# Patient Record
Sex: MALE | Race: BLACK OR AFRICAN AMERICAN | NOT HISPANIC OR LATINO | Employment: OTHER | URBAN - METROPOLITAN AREA
[De-identification: names, ages, dates, MRNs, and addresses within clinical notes are randomized per-mention and may not be internally consistent; named-entity substitution may affect disease eponyms.]

---

## 2023-09-22 ENCOUNTER — HOSPITAL ENCOUNTER (OUTPATIENT)
Dept: DATA CONVERSION | Facility: HOSPITAL | Age: 63
Discharge: HOME HEALTH | End: 2023-09-24

## 2023-09-22 DIAGNOSIS — R33.9 RETENTION OF URINE, UNSPECIFIED: ICD-10-CM

## 2023-09-22 DIAGNOSIS — Z90.5 ACQUIRED ABSENCE OF KIDNEY: ICD-10-CM

## 2023-09-22 DIAGNOSIS — R11.2 NAUSEA WITH VOMITING, UNSPECIFIED: ICD-10-CM

## 2023-09-22 DIAGNOSIS — R10.819 ABDOMINAL TENDERNESS, UNSPECIFIED SITE: ICD-10-CM

## 2023-09-22 DIAGNOSIS — E83.52 HYPERCALCEMIA: ICD-10-CM

## 2023-09-22 DIAGNOSIS — R42 DIZZINESS AND GIDDINESS: ICD-10-CM

## 2023-09-22 DIAGNOSIS — H40.9 UNSPECIFIED GLAUCOMA: ICD-10-CM

## 2023-09-22 LAB
ALBUMIN SERPL-MCNC: 4.2 GM/DL (ref 3.5–5)
ALBUMIN/GLOB SERPL: 1.1 RATIO (ref 1.5–3)
ALP BLD-CCNC: 43 U/L (ref 35–125)
ALT SERPL-CCNC: 10 U/L (ref 5–40)
ALT SERPL-CCNC: ABNORMAL U/L (ref 5–40)
ANION GAP SERPL CALCULATED.3IONS-SCNC: 11 MMOL/L (ref 0–19)
ANTICOAGULANT: NORMAL
ANTICOAGULANT: NORMAL
APTT PPP: 23 SEC (ref 22–32.5)
AST SERPL-CCNC: 13 U/L (ref 5–40)
AST SERPL-CCNC: ABNORMAL U/L (ref 5–40)
BACTERIA UR QL AUTO: NEGATIVE
BASOPHILS # BLD AUTO: 0.03 K/UL (ref 0–0.22)
BASOPHILS NFR BLD AUTO: 0.4 % (ref 0–1)
BILIRUB DIRECT SERPL-MCNC: 0.2 MG/DL (ref 0–0.2)
BILIRUB INDIRECT SERPL-MCNC: 0.2 MG/DL (ref 0–0.8)
BILIRUB SERPL-MCNC: 0.4 MG/DL (ref 0.1–1.2)
BILIRUB UR QL STRIP.AUTO: NEGATIVE
BUN SERPL-MCNC: 13 MG/DL (ref 8–25)
BUN/CREAT SERPL: 14.4 RATIO (ref 8–21)
CALCIUM SERPL-MCNC: 11.2 MG/DL (ref 8.5–10.4)
CHLORIDE SERPL-SCNC: 105 MMOL/L (ref 97–107)
CLARITY UR: CLEAR
CO2 SERPL-SCNC: 25 MMOL/L (ref 24–31)
COLOR UR: ABNORMAL
CREAT SERPL-MCNC: 0.9 MG/DL (ref 0.4–1.6)
DEPRECATED RDW RBC AUTO: 46.7 FL (ref 37–54)
DIFFERENTIAL METHOD BLD: ABNORMAL
EOSINOPHIL # BLD AUTO: 0.05 K/UL (ref 0–0.45)
EOSINOPHIL NFR BLD: 0.7 % (ref 0–3)
ERYTHROCYTE [DISTWIDTH] IN BLOOD BY AUTOMATED COUNT: 13.6 % (ref 11.7–15)
GFR SERPL CREATININE-BSD FRML MDRD: 96 ML/MIN/1.73 M2
GLOBULIN SER-MCNC: 3.7 G/DL (ref 1.9–3.7)
GLUCOSE SERPL-MCNC: 148 MG/DL (ref 65–99)
GLUCOSE UR STRIP.AUTO-MCNC: NEGATIVE MG/DL
HCT VFR BLD AUTO: 50.7 % (ref 41–50)
HGB BLD-MCNC: 15.9 GM/DL (ref 13.5–16.5)
HGB UR QL STRIP.AUTO: 1 /HPF (ref 0–3)
HGB UR QL: NEGATIVE
HS TROPONIN T DELTA: 1 (ref 0–4)
HS TROPONIN T DELTA: NORMAL (ref 0–4)
HYALINE CASTS UR QL AUTO: ABNORMAL /LPF
IMM GRANULOCYTES # BLD AUTO: 0.04 K/UL (ref 0–0.1)
INR PPP: 1 (ref 0.86–1.16)
KETONES UR QL STRIP.AUTO: NEGATIVE
LEUKOCYTE ESTERASE UR QL STRIP.AUTO: NEGATIVE
LYMPHOCYTES # BLD AUTO: 1.38 K/UL (ref 1.2–3.2)
LYMPHOCYTES NFR BLD MANUAL: 19 % (ref 20–40)
MCH RBC QN AUTO: 28.8 PG (ref 26–34)
MCHC RBC AUTO-ENTMCNC: 31.4 % (ref 31–37)
MCV RBC AUTO: 91.7 FL (ref 80–100)
MICROSCOPIC (UA): ABNORMAL
MONOCYTES # BLD AUTO: 0.51 K/UL (ref 0–0.8)
MONOCYTES NFR BLD MANUAL: 7 % (ref 0–8)
NEUTROPHILS # BLD AUTO: 5.24 K/UL
NEUTROPHILS # BLD AUTO: 5.24 K/UL (ref 1.8–7.7)
NEUTROPHILS.IMMATURE NFR BLD: 0.6 % (ref 0–1)
NEUTS SEG NFR BLD: 72.3 % (ref 50–70)
NITRITE UR QL STRIP.AUTO: NEGATIVE
NRBC BLD-RTO: 0 /100 WBC
PH UR STRIP.AUTO: 6 [PH] (ref 4.6–8)
PLATELET # BLD AUTO: 248 K/UL (ref 150–450)
PMV BLD AUTO: 11.1 CU (ref 7–12.6)
POTASSIUM SERPL-SCNC: 4.7 MMOL/L (ref 3.4–5.1)
POTASSIUM SERPL-SCNC: ABNORMAL MMOL/L (ref 3.4–5.1)
PROT SERPL-MCNC: 7.9 G/DL (ref 5.9–7.9)
PROT UR STRIP.AUTO-MCNC: ABNORMAL MG/DL
PROTHROMBIN TIME: 10.3 SEC (ref 9.3–12.7)
RBC # BLD AUTO: 5.53 M/UL (ref 4.5–5.5)
SODIUM SERPL-SCNC: 140 MMOL/L (ref 133–145)
SP GR UR STRIP.AUTO: 1.02 (ref 1–1.03)
SQUAMOUS UR QL AUTO: ABNORMAL /HPF
TROPONIN T SERPL-MCNC: 6 NG/L
TROPONIN T SERPL-MCNC: 7 NG/L
URINE CULTURE: ABNORMAL
UROBILINOGEN UR QL STRIP.AUTO: NORMAL MG/DL (ref 0–1)
WBC # BLD AUTO: 7.3 K/UL (ref 4.5–11)
WBC #/AREA URNS AUTO: 1 /HPF (ref 0–3)

## 2023-09-23 LAB
25(OH)D3 SERPL-MCNC: 8 NG/ML (ref 31–100)
ALBUMIN SERPL-MCNC: 3.4 GM/DL (ref 3.5–5)
ALBUMIN/GLOB SERPL: 1 RATIO (ref 1.5–3)
ALP BLD-CCNC: 38 U/L (ref 35–125)
ALT SERPL-CCNC: 10 U/L (ref 5–40)
ANION GAP SERPL CALCULATED.3IONS-SCNC: 7 MMOL/L (ref 0–19)
APPEARANCE PLAS: CLEAR
AST SERPL-CCNC: 13 U/L (ref 5–40)
BILIRUB SERPL-MCNC: 0.7 MG/DL (ref 0.1–1.2)
BUN SERPL-MCNC: 12 MG/DL (ref 8–25)
BUN/CREAT SERPL: 12 RATIO (ref 8–21)
CALCIUM SERPL-MCNC: 10.1 MG/DL (ref 8.5–10.4)
CHLORIDE SERPL-SCNC: 107 MMOL/L (ref 97–107)
CHOLEST SERPL-MCNC: 182 MG/DL (ref 133–200)
CHOLEST/HDLC SERPL: 2.7 RATIO
CO2 SERPL-SCNC: 23 MMOL/L (ref 24–31)
COLOR SPUN FLD: YELLOW
CREAT SERPL-MCNC: 1 MG/DL (ref 0.4–1.6)
DEPRECATED RDW RBC AUTO: 46.5 FL (ref 37–54)
ERYTHROCYTE [DISTWIDTH] IN BLOOD BY AUTOMATED COUNT: 13.8 % (ref 11.7–15)
FASTING STATUS PATIENT QL REPORTED: NORMAL
GFR SERPL CREATININE-BSD FRML MDRD: 85 ML/MIN/1.73 M2
GLOBULIN SER-MCNC: 3.4 G/DL (ref 1.9–3.7)
GLUCOSE BLD STRIP.AUTO-MCNC: 129 MG/DL (ref 65–99)
GLUCOSE BLD STRIP.AUTO-MCNC: 89 MG/DL (ref 65–99)
GLUCOSE BLD STRIP.AUTO-MCNC: 91 MG/DL (ref 65–99)
GLUCOSE SERPL-MCNC: 101 MG/DL (ref 65–99)
HBA1C MFR BLD: 5.9 % (ref 4–6)
HCT VFR BLD AUTO: 43.3 % (ref 41–50)
HDLC SERPL-MCNC: 67 MG/DL
HGB BLD-MCNC: 13.9 GM/DL (ref 13.5–16.5)
LDLC SERPL CALC-MCNC: 93 MG/DL (ref 65–130)
MCH RBC QN AUTO: 29.3 PG (ref 26–34)
MCHC RBC AUTO-ENTMCNC: 32.1 % (ref 31–37)
MCV RBC AUTO: 91.4 FL (ref 80–100)
NRBC BLD-RTO: 0 /100 WBC
PHOSPHATE SERPL-MCNC: 2.2 MG/DL (ref 2.5–4.5)
PLATELET # BLD AUTO: 192 K/UL (ref 150–450)
PMV BLD AUTO: 11 CU (ref 7–12.6)
POTASSIUM SERPL-SCNC: 4.1 MMOL/L (ref 3.4–5.1)
PROT SERPL-MCNC: 6.8 G/DL (ref 5.9–7.9)
RBC # BLD AUTO: 4.74 M/UL (ref 4.5–5.5)
SODIUM SERPL-SCNC: 137 MMOL/L (ref 133–145)
TRIGL SERPL-MCNC: 109 MG/DL (ref 40–150)
WBC # BLD AUTO: 5.8 K/UL (ref 4.5–11)

## 2023-09-24 LAB — GLUCOSE BLD STRIP.AUTO-MCNC: 109 MG/DL (ref 65–99)

## 2023-09-27 LAB — 1,25(OH)2D3 SERPL-MCNC: NORMAL PG/ML

## 2023-10-02 LAB
CALCIUM SERPL-MCNC: 10.1 MG/DL (ref 8.5–10.4)
PHOSPHATE SERPL-MCNC: 2.2 MG/DL (ref 2.5–4.5)
PTH-INTACT SERPL-MCNC: ABNORMAL PG/ML (ref 15–65)

## 2023-10-30 ENCOUNTER — TELEPHONE (OUTPATIENT)
Dept: PRIMARY CARE | Facility: CLINIC | Age: 63
End: 2023-10-30
Payer: MEDICARE

## 2023-10-30 NOTE — TELEPHONE ENCOUNTER
In regards to received message, can we please clarify if patient is requesting to have referral to local urologist or if he is wanting a referral to a urologist in [place].  Separately, I would also need to know the diagnosis related to his surgery or active symptoms that referral is in nature to.

## 2023-10-30 NOTE — TELEPHONE ENCOUNTER
Pt called in for a referral for a urologist states that this is in regards to a surgery that has went bad the doctors that handled this are  Dr. Jn Graff  [phone] Phone in [facility] ([facility] know the spelling) also [facility] [phone] has other information regarding this matter. His appointment for the referral is this 11/02/2023

## 2023-11-01 RX ORDER — VIT C/E/ZN/COPPR/LUTEIN/ZEAXAN 250MG-90MG
25 CAPSULE ORAL
COMMUNITY
Start: 2023-10-21

## 2023-11-01 RX ORDER — MECLIZINE HYDROCHLORIDE 25 MG/1
25 TABLET ORAL 3 TIMES DAILY PRN
COMMUNITY
Start: 2023-09-24 | End: 2023-11-02 | Stop reason: WASHOUT

## 2023-11-02 ENCOUNTER — OFFICE VISIT (OUTPATIENT)
Dept: PRIMARY CARE | Facility: CLINIC | Age: 63
End: 2023-11-02
Payer: MEDICARE

## 2023-11-02 VITALS
HEART RATE: 72 BPM | WEIGHT: 244.8 LBS | DIASTOLIC BLOOD PRESSURE: 80 MMHG | HEIGHT: 69 IN | TEMPERATURE: 96.5 F | SYSTOLIC BLOOD PRESSURE: 142 MMHG | OXYGEN SATURATION: 99 % | BODY MASS INDEX: 36.26 KG/M2
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
## 2023-11-02 DIAGNOSIS — Z90.5 HISTORY OF NEPHRECTOMY, LEFT: ICD-10-CM

## 2023-11-02 DIAGNOSIS — R39.11 URINARY HESITANCY: ICD-10-CM

## 2023-11-02 DIAGNOSIS — N53.19 ABNORMAL EJACULATION: ICD-10-CM

## 2023-11-02 DIAGNOSIS — M25.512 ACUTE PAIN OF LEFT SHOULDER: ICD-10-CM

## 2023-11-02 DIAGNOSIS — R39.198 URINARY DYSFUNCTION: Primary | ICD-10-CM

## 2023-11-02 DIAGNOSIS — M25.552 LEFT HIP PAIN: ICD-10-CM

## 2023-11-02 PROBLEM — R35.0 URINARY FREQUENCY: Status: ACTIVE | Noted: 2023-11-02

## 2023-11-02 PROCEDURE — 99214 OFFICE O/P EST MOD 30 MIN: CPT | Performed by: FAMILY MEDICINE

## 2023-11-02 PROCEDURE — 1036F TOBACCO NON-USER: CPT | Performed by: FAMILY MEDICINE

## 2023-11-02 RX ORDER — HYDROCODONE BITARTRATE AND ACETAMINOPHEN 10; 325 MG/1; MG/1
1 TABLET ORAL EVERY 6 HOURS PRN
Qty: 21 TABLET | Refills: 0 | Status: SHIPPED | OUTPATIENT
Start: 2023-11-02 | End: 2023-11-13

## 2023-11-02 RX ORDER — DICLOFENAC SODIUM 10 MG/G
4 GEL TOPICAL 4 TIMES DAILY
Qty: 100 G | Refills: 1 | Status: SHIPPED | OUTPATIENT
Start: 2023-11-02 | End: 2023-11-13 | Stop reason: ALTCHOICE

## 2023-11-02 ASSESSMENT — PAIN SCALES - GENERAL: PAINLEVEL: 7

## 2023-11-02 NOTE — ASSESSMENT & PLAN NOTE
- Continue to work with chiropractic medicine with pending imaging  -Secondary to your ongoing shoulder and hip pain and limitations in pain medications from both effectiveness and safety profile, I have sent discussed pain medication with plans to use sparingly  -As they are controlled medications, we did talk about my limitations to give further prescriptions.  If pain complaints continue, would need to link with pain management for any going need/use
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
## 2023-11-02 NOTE — ASSESSMENT & PLAN NOTE
- Secondary to your symptoms and history, will plan to coordinate with new local urologist with referral placed today

## 2023-11-13 ENCOUNTER — OFFICE VISIT (OUTPATIENT)
Dept: UROLOGY | Facility: CLINIC | Age: 63
End: 2023-11-13
Payer: MEDICARE

## 2023-11-13 VITALS
TEMPERATURE: 97.3 F | HEIGHT: 69 IN | DIASTOLIC BLOOD PRESSURE: 78 MMHG | WEIGHT: 244.4 LBS | SYSTOLIC BLOOD PRESSURE: 142 MMHG | BODY MASS INDEX: 36.2 KG/M2

## 2023-11-13 DIAGNOSIS — R35.0 URINARY FREQUENCY: ICD-10-CM

## 2023-11-13 DIAGNOSIS — N53.19 ABNORMAL EJACULATION: ICD-10-CM

## 2023-11-13 DIAGNOSIS — R31.29 OTHER MICROSCOPIC HEMATURIA: ICD-10-CM

## 2023-11-13 DIAGNOSIS — R39.11 URINARY HESITANCY: ICD-10-CM

## 2023-11-13 DIAGNOSIS — N20.0 NEPHROLITHIASIS: ICD-10-CM

## 2023-11-13 DIAGNOSIS — R39.198 URINARY DYSFUNCTION: ICD-10-CM

## 2023-11-13 DIAGNOSIS — Z90.5 HISTORY OF NEPHRECTOMY, LEFT: ICD-10-CM

## 2023-11-13 DIAGNOSIS — R82.90 ABNORMAL URINALYSIS: ICD-10-CM

## 2023-11-13 DIAGNOSIS — R82.90 ABNORMAL URINALYSIS: Primary | ICD-10-CM

## 2023-11-13 LAB
POC APPEARANCE, URINE: CLEAR
POC BILIRUBIN, URINE: NEGATIVE
POC BLOOD, URINE: ABNORMAL
POC COLOR, URINE: YELLOW
POC GLUCOSE, URINE: NEGATIVE MG/DL
POC KETONES, URINE: NEGATIVE MG/DL
POC LEUKOCYTES, URINE: NEGATIVE
POC NITRITE,URINE: NEGATIVE
POC PH, URINE: 5.5 PH
POC PROTEIN, URINE: NEGATIVE MG/DL
POC SPECIFIC GRAVITY, URINE: 1.02
POC UROBILINOGEN, URINE: 0.2 EU/DL

## 2023-11-13 PROCEDURE — 81002 URINALYSIS NONAUTO W/O SCOPE: CPT | Performed by: STUDENT IN AN ORGANIZED HEALTH CARE EDUCATION/TRAINING PROGRAM

## 2023-11-13 PROCEDURE — 99204 OFFICE O/P NEW MOD 45 MIN: CPT | Performed by: STUDENT IN AN ORGANIZED HEALTH CARE EDUCATION/TRAINING PROGRAM

## 2023-11-13 PROCEDURE — 81001 URINALYSIS AUTO W/SCOPE: CPT

## 2023-11-13 PROCEDURE — 1036F TOBACCO NON-USER: CPT | Performed by: STUDENT IN AN ORGANIZED HEALTH CARE EDUCATION/TRAINING PROGRAM

## 2023-11-13 NOTE — PROGRESS NOTES
Taye presents as a new patient for an evaluation.  The patient’s EMR has been reviewed.  Lives in [place], [place].  Referred by Dr. Villalpando, PCP.    Longstanding h/o recurrent kidney stones.    Previously followed by urologist in [place], at [facility].  Has not seen a Urologist since moving to [place] in 2022.   S/p left nephrectomy in 2020 secondary to recurrent stone formation and decreased kidney function.   This was a pelvic kidney  It was complicated by a contralateral ureter injury requiring ureter reimplant     The procedure was initially performed robotically, however was decided to switch to manually. The transition to manual was c/b an injury to intra-abdominal structures including stomach and right ureter. Since then, has c/o ongoing urinary and ejaculation dysfunction.    SUBJECTIVE: HPI   TODAY (11/13/23)  C/o weak stream, urinary urgency, fequency (q1hr) and hesitancy.    Previously was on tamsulosin, however no benefit. Since discontinued.   Denotes anejaculation as well since the nephrectomy procedure.   Additional PSH of undergoing a Urolift procedure following the nephrectomy  Reports ejaculatory dysfunction since nephrectomy  Has no semen with orgasm  Denies medical or other surgical history of BPH    Latest CT (09/22/23) reviewed.   IO urinalysis showed trace blood, no infection.  Renal function considered stable (Sept 2023).    Past medical, surgical, family and social history in the chart was reviewed and is accurate including any additions to what is in this HPI.    Review of Systems   Constitutional: denies any unintentional weight loss or change in strength.  Integumentary: denies any rashes or pruritus.  Eyes: denies any double vision or eye pain.  Ear/Nose/Mouth/Throat: denies any nosebleeds or gum bleeds.  Cardiovascular: denies any chest pain or syncope.  Respiratory: denies hemoptysis.  Gastrointestinal: denies nausea or vomiting.  Musculoskeletal: denies muscle  cramping or weakness.  Neurologic: denies convulsions or seizures.  Hematologic/Lymphatic: denies bleeding tendencies.  Endocrine: denies heat/cold intolerance.  All other systems have been reviewed and are negative unless otherwise noted in the HPI.    OBJECTIVE:  There were no vitals taken for this visit.  Physical Exam   Constitutional: No obvious distress.  Eyes: Non-injected conjunctiva, sclera clear, EOMI.  Ears/Nose/Mouth/Throat: No obvious drainage per ears or nose.  Cardiovascular: Extremities are warm and well perfused. No edema, cyanosis or pallor.  Respiratory: No audible wheezing/stridor; respirations do not appear labored.  Gastrointestinal: Abdomen soft, not distended.  Musculoskeletal: Normal ROM of extremities.  Skin: No obvious rashes or open sores.  Neurologic: Alert and oriented, CN 2-12 grossly intact.  Psychiatric: Answers questions appropriately with normal affect.  Hematologic/Lymphatic/Immunologic: No obvious bruises or sites of spontaneous bleeding.  Genitourinary: No CVA tenderness, bladder not palpable.     Labs and imaging:  Lab Results   Component Value Date    WBC 5.8 09/23/2023    HGB 13.9 09/23/2023    HCT 43.3 09/23/2023     09/23/2023    CHOL 182 09/23/2023    TRIG 109 09/23/2023    HDL 67 09/23/2023    ALT 10 09/23/2023    AST 13 09/23/2023     09/23/2023    K 4.1 09/23/2023     09/23/2023    CREATININE 1.0 09/23/2023    BUN 12 09/23/2023    CO2 23 (L) 09/23/2023    INR 1.0 09/22/2023    HGBA1C 5.9 09/23/2023     ASSESSMENT:  Problem List Items Addressed This Visit       Urinary hesitancy    Urinary frequency - Primary    Relevant Orders    POCT UA (nonautomated) manually resulted (Completed)    Measure post void residual (Completed)    Cystourethroscopy    Follow Up In Urology    Urinary dysfunction    Abnormal ejaculation    Relevant Orders    Cystourethroscopy    History of nephrectomy, left    Relevant Orders    US renal complete     Other Visit Diagnoses        Nephrolithiasis        Relevant Orders    US renal complete     PLAN:  Obtain a renal US for stone surveillance, next due in 1 year  Report from last CT A/P from 09/2023 reviewed, no hydro or stones of right kidney, mild pelviectasis and diverticulum of the bladder likely related to previous reimplant   IO urinalysis showed trace blood. Send for micro.   Plan to FU in a 4 weeks for flow/pvr given his concern for scar tissue causing his urinary and ejaculatory dysfunction    If significantly obstructed, discussed cystoscopy.  He is hesitant with proceeding with cystoscopy 2/2 anxiety.   Will revisit plan after review of his uroflow.    All questions were answered to the patient’s satisfaction.  Patient agrees with the plan and wishes to proceed.    Scribed for Dr. Bart Dean by Javier Manuel.  I, Dr. Bart Dean have personally reviewed and agreed with the information entered by the Virtual Scribe. 11/13/23.

## 2023-11-14 LAB
RBC #/AREA URNS AUTO: NORMAL /HPF
WBC #/AREA URNS AUTO: NORMAL /HPF

## 2023-11-27 ENCOUNTER — OFFICE VISIT (OUTPATIENT)
Dept: UROLOGY | Facility: CLINIC | Age: 63
End: 2023-11-27
Payer: MEDICAID

## 2023-11-27 VITALS — HEIGHT: 68 IN | BODY MASS INDEX: 37.13 KG/M2 | TEMPERATURE: 97.5 F | WEIGHT: 245 LBS

## 2023-11-27 DIAGNOSIS — R39.11 URINARY HESITANCY: Primary | ICD-10-CM

## 2023-11-27 DIAGNOSIS — R35.0 BENIGN PROSTATIC HYPERPLASIA WITH URINARY FREQUENCY: ICD-10-CM

## 2023-11-27 DIAGNOSIS — R35.0 URINARY FREQUENCY: ICD-10-CM

## 2023-11-27 DIAGNOSIS — N40.1 BENIGN PROSTATIC HYPERPLASIA WITH URINARY FREQUENCY: ICD-10-CM

## 2023-11-27 LAB
POC APPEARANCE, URINE: CLEAR
POC BILIRUBIN, URINE: NEGATIVE
POC BLOOD, URINE: ABNORMAL
POC COLOR, URINE: YELLOW
POC GLUCOSE, URINE: NEGATIVE MG/DL
POC KETONES, URINE: NEGATIVE MG/DL
POC LEUKOCYTES, URINE: NEGATIVE
POC NITRITE,URINE: NEGATIVE
POC PH, URINE: 6 PH
POC PROTEIN, URINE: NEGATIVE MG/DL
POC SPECIFIC GRAVITY, URINE: 1.02
POC UROBILINOGEN, URINE: 0.2 EU/DL

## 2023-11-27 PROCEDURE — 51736 URINE FLOW MEASUREMENT: CPT | Performed by: STUDENT IN AN ORGANIZED HEALTH CARE EDUCATION/TRAINING PROGRAM

## 2023-11-27 PROCEDURE — 1036F TOBACCO NON-USER: CPT | Performed by: STUDENT IN AN ORGANIZED HEALTH CARE EDUCATION/TRAINING PROGRAM

## 2023-11-27 PROCEDURE — 81003 URINALYSIS AUTO W/O SCOPE: CPT | Performed by: STUDENT IN AN ORGANIZED HEALTH CARE EDUCATION/TRAINING PROGRAM

## 2023-11-27 PROCEDURE — 51798 US URINE CAPACITY MEASURE: CPT | Performed by: STUDENT IN AN ORGANIZED HEALTH CARE EDUCATION/TRAINING PROGRAM

## 2023-11-27 PROCEDURE — 99214 OFFICE O/P EST MOD 30 MIN: CPT | Performed by: STUDENT IN AN ORGANIZED HEALTH CARE EDUCATION/TRAINING PROGRAM

## 2023-11-27 RX ORDER — TAMSULOSIN HYDROCHLORIDE 0.4 MG/1
0.4 CAPSULE ORAL DAILY
Qty: 30 CAPSULE | Refills: 2 | Status: SHIPPED | OUTPATIENT
Start: 2023-11-27 | End: 2024-01-15 | Stop reason: SDUPTHER
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
## 2024-01-08 ENCOUNTER — OFFICE VISIT (OUTPATIENT)
Dept: UROLOGY | Facility: CLINIC | Age: 64
End: 2024-01-08
Payer: COMMERCIAL

## 2024-01-08 VITALS — TEMPERATURE: 97.4 F | BODY MASS INDEX: 34.07 KG/M2 | HEIGHT: 69 IN | WEIGHT: 230 LBS

## 2024-01-08 DIAGNOSIS — N40.1 BENIGN PROSTATIC HYPERPLASIA WITH INCOMPLETE BLADDER EMPTYING: Primary | ICD-10-CM

## 2024-01-08 DIAGNOSIS — Z90.5 HISTORY OF NEPHRECTOMY, LEFT: ICD-10-CM

## 2024-01-08 DIAGNOSIS — R39.14 BENIGN PROSTATIC HYPERPLASIA WITH INCOMPLETE BLADDER EMPTYING: Primary | ICD-10-CM

## 2024-01-08 DIAGNOSIS — N20.0 NEPHROLITHIASIS: ICD-10-CM

## 2024-01-08 PROCEDURE — 1036F TOBACCO NON-USER: CPT | Performed by: STUDENT IN AN ORGANIZED HEALTH CARE EDUCATION/TRAINING PROGRAM

## 2024-01-08 PROCEDURE — 99214 OFFICE O/P EST MOD 30 MIN: CPT | Performed by: STUDENT IN AN ORGANIZED HEALTH CARE EDUCATION/TRAINING PROGRAM

## 2024-01-08 RX ORDER — ALFUZOSIN HYDROCHLORIDE 10 MG/1
10 TABLET, EXTENDED RELEASE ORAL DAILY
Qty: 30 TABLET | Refills: 2 | Status: SHIPPED | OUTPATIENT
Start: 2024-01-08 | End: 2024-04-12 | Stop reason: WASHOUT

## 2024-01-08 ASSESSMENT — PAIN SCALES - GENERAL: PAINLEVEL: 0-NO PAIN
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
## 2024-01-14 DIAGNOSIS — R39.11 URINARY HESITANCY: ICD-10-CM

## 2024-01-14 DIAGNOSIS — R35.0 BENIGN PROSTATIC HYPERPLASIA WITH URINARY FREQUENCY: ICD-10-CM

## 2024-01-14 DIAGNOSIS — R35.0 URINARY FREQUENCY: ICD-10-CM

## 2024-01-14 DIAGNOSIS — N40.1 BENIGN PROSTATIC HYPERPLASIA WITH URINARY FREQUENCY: ICD-10-CM

## 2024-01-15 DIAGNOSIS — R35.0 BENIGN PROSTATIC HYPERPLASIA WITH URINARY FREQUENCY: ICD-10-CM

## 2024-01-15 DIAGNOSIS — R39.11 URINARY HESITANCY: ICD-10-CM

## 2024-01-15 DIAGNOSIS — R35.0 URINARY FREQUENCY: ICD-10-CM

## 2024-01-15 DIAGNOSIS — N40.1 BENIGN PROSTATIC HYPERPLASIA WITH URINARY FREQUENCY: ICD-10-CM

## 2024-01-15 RX ORDER — TAMSULOSIN HYDROCHLORIDE 0.4 MG/1
0.4 CAPSULE ORAL DAILY
Qty: 30 CAPSULE | Refills: 2 | Status: SHIPPED | OUTPATIENT
Start: 2024-01-15 | End: 2024-04-12 | Stop reason: WASHOUT

## 2024-03-11 ENCOUNTER — APPOINTMENT (OUTPATIENT)
Dept: UROLOGY | Facility: CLINIC | Age: 64
End: 2024-03-11
Payer: COMMERCIAL

## 2024-04-10 RX ORDER — TAMSULOSIN HYDROCHLORIDE 0.4 MG/1
0.4 CAPSULE ORAL DAILY
Qty: 90 CAPSULE | Refills: 1 | Status: SHIPPED | OUTPATIENT
Start: 2024-04-10

## 2024-04-12 ENCOUNTER — OFFICE VISIT (OUTPATIENT)
Dept: PRIMARY CARE | Facility: CLINIC | Age: 64
End: 2024-04-12
Payer: COMMERCIAL

## 2024-04-12 VITALS
DIASTOLIC BLOOD PRESSURE: 90 MMHG | WEIGHT: 244.2 LBS | TEMPERATURE: 96.3 F | OXYGEN SATURATION: 97 % | BODY MASS INDEX: 36.17 KG/M2 | HEART RATE: 64 BPM | SYSTOLIC BLOOD PRESSURE: 158 MMHG | HEIGHT: 69 IN

## 2024-04-12 DIAGNOSIS — G89.29 CHRONIC RIGHT SHOULDER PAIN: ICD-10-CM

## 2024-04-12 DIAGNOSIS — M25.511 CHRONIC RIGHT SHOULDER PAIN: ICD-10-CM

## 2024-04-12 DIAGNOSIS — M25.551 RIGHT HIP PAIN: Primary | ICD-10-CM

## 2024-04-12 PROBLEM — M25.512 ACUTE PAIN OF LEFT SHOULDER: Status: RESOLVED | Noted: 2023-11-02 | Resolved: 2024-04-12

## 2024-04-12 PROCEDURE — 99214 OFFICE O/P EST MOD 30 MIN: CPT | Performed by: FAMILY MEDICINE

## 2024-04-12 RX ORDER — HYDROCODONE BITARTRATE AND ACETAMINOPHEN 10; 325 MG/1; MG/1
1 TABLET ORAL EVERY 6 HOURS PRN
Qty: 28 TABLET | Refills: 0 | Status: SHIPPED | OUTPATIENT
Start: 2024-04-12 | End: 2024-04-19

## 2024-04-12 ASSESSMENT — ENCOUNTER SYMPTOMS
LOSS OF SENSATION IN FEET: 0
OCCASIONAL FEELINGS OF UNSTEADINESS: 1
DEPRESSION: 0

## 2024-04-12 ASSESSMENT — PATIENT HEALTH QUESTIONNAIRE - PHQ9
2. FEELING DOWN, DEPRESSED OR HOPELESS: NOT AT ALL
1. LITTLE INTEREST OR PLEASURE IN DOING THINGS: NOT AT ALL
SUM OF ALL RESPONSES TO PHQ9 QUESTIONS 1 AND 2: 0

## 2024-04-12 ASSESSMENT — LIFESTYLE VARIABLES
HOW OFTEN DO YOU HAVE SIX OR MORE DRINKS ON ONE OCCASION: NEVER
HOW OFTEN DO YOU HAVE A DRINK CONTAINING ALCOHOL: NEVER

## 2024-04-12 ASSESSMENT — PAIN SCALES - GENERAL: PAINLEVEL: 8
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
## 2024-04-12 NOTE — PATIENT INSTRUCTIONS
Problem List Items Addressed This Visit             ICD-10-CM    Chronic right shoulder pain M25.511, G89.29    Relevant Medications    HYDROcodone-acetaminophen (Norco)  mg tablet    Other Relevant Orders    Referral to Pain Medicine    Right hip pain - Primary M25.551     - With ongoing symptoms and prior orthopedic evaluations with preference to avoid surgical intervention, will plan to continue on sparing use of as needed pain medication and set up formal consultation with pain management specialist; referral placed         Relevant Medications    HYDROcodone-acetaminophen (Norco)  mg tablet    Other Relevant Orders    Referral to Pain Medicine

## 2024-04-12 NOTE — ASSESSMENT & PLAN NOTE
- With ongoing symptoms and prior orthopedic evaluations with preference to avoid surgical intervention, will plan to continue on sparing use of as needed pain medication and set up formal consultation with pain management specialist; referral placed

## 2024-05-23 ENCOUNTER — OFFICE VISIT (OUTPATIENT)
Dept: PAIN MEDICINE | Facility: CLINIC | Age: 64
End: 2024-05-23
Payer: COMMERCIAL

## 2024-05-23 VITALS
SYSTOLIC BLOOD PRESSURE: 132 MMHG | HEIGHT: 69 IN | HEART RATE: 64 BPM | DIASTOLIC BLOOD PRESSURE: 72 MMHG | RESPIRATION RATE: 16 BRPM | WEIGHT: 233 LBS | OXYGEN SATURATION: 97 % | BODY MASS INDEX: 34.51 KG/M2

## 2024-05-23 DIAGNOSIS — M25.551 RIGHT HIP PAIN: ICD-10-CM

## 2024-05-23 DIAGNOSIS — M16.11 PRIMARY OSTEOARTHRITIS OF RIGHT HIP: Primary | ICD-10-CM

## 2024-05-23 PROCEDURE — 99214 OFFICE O/P EST MOD 30 MIN: CPT | Performed by: ANESTHESIOLOGY

## 2024-05-23 PROCEDURE — 99204 OFFICE O/P NEW MOD 45 MIN: CPT | Performed by: ANESTHESIOLOGY

## 2024-05-23 PROCEDURE — 1036F TOBACCO NON-USER: CPT | Performed by: ANESTHESIOLOGY

## 2024-05-23 ASSESSMENT — PAIN DESCRIPTION - DESCRIPTORS: DESCRIPTORS: ACHING

## 2024-05-23 ASSESSMENT — ENCOUNTER SYMPTOMS
PSYCHIATRIC NEGATIVE: 1
ALLERGIC/IMMUNOLOGIC NEGATIVE: 1
EYES NEGATIVE: 1
GASTROINTESTINAL NEGATIVE: 1
HIP PAIN: 1
BACK PAIN: 1
RESPIRATORY NEGATIVE: 1
HEMATOLOGIC/LYMPHATIC NEGATIVE: 1
CARDIOVASCULAR NEGATIVE: 1
CONSTITUTIONAL NEGATIVE: 1

## 2024-05-23 ASSESSMENT — PATIENT HEALTH QUESTIONNAIRE - PHQ9
1. LITTLE INTEREST OR PLEASURE IN DOING THINGS: NOT AT ALL
2. FEELING DOWN, DEPRESSED OR HOPELESS: NOT AT ALL
SUM OF ALL RESPONSES TO PHQ9 QUESTIONS 1 AND 2: 0

## 2024-05-23 ASSESSMENT — PAIN - FUNCTIONAL ASSESSMENT: PAIN_FUNCTIONAL_ASSESSMENT: 0-10

## 2024-05-23 ASSESSMENT — LIFESTYLE VARIABLES: TOTAL SCORE: 0

## 2024-05-23 ASSESSMENT — PAIN SCALES - GENERAL: PAINLEVEL_OUTOF10: 2

## 2024-05-23 NOTE — PROGRESS NOTES
Subjective   Patient ID: Taye Gaming is a 63 y.o. male who presents for Hip Pain.  Hip Pain       Patient here today for a new patient evaluation of his right hip pain that has been present for the last 1-2 years.  His pain is located on the lateral aspect of his right hip.  He has pain that is present all the time.  He finds that stretching will make the hip worse.  He attended a Chiro and it was not helpful.  He is able to take stairs but it does cause pain.  He reports no weakness in the right hip.  He denies any numbness and tingling in the right leg or back.  Patient will notice the longer he walks he will get more pain and will get fatigue and a feeling his hip gives out.  He does have alteration in his gait with prolonged walking.  He would like to move towards interventional options to get his pain under better control.  He states he is very active he takes about 600 steps per week.  Because of an apartment building that he lives in.  He walks around his neighborhood.  He tries to maintain as active as he possibly can.  He reports his average daily pain is a 4-5 out of 10.  Review of Systems   Constitutional: Negative.    HENT: Negative.     Eyes: Negative.    Respiratory: Negative.     Cardiovascular: Negative.    Gastrointestinal: Negative.    Genitourinary: Negative.    Musculoskeletal:  Positive for back pain and gait problem.   Skin: Negative.    Allergic/Immunologic: Negative.    Hematological: Negative.    Psychiatric/Behavioral: Negative.         Objective   Physical Exam  Vitals and nursing note reviewed.   Constitutional:       Appearance: Normal appearance.   HENT:      Head: Normocephalic and atraumatic.      Right Ear: Ear canal and external ear normal.      Left Ear: Ear canal and external ear normal.      Nose: Nose normal.      Mouth/Throat:      Mouth: Mucous membranes are moist.      Pharynx: Oropharynx is clear.   Eyes:      Conjunctiva/sclera: Conjunctivae normal.      Pupils: Pupils are  equal, round, and reactive to light.   Cardiovascular:      Rate and Rhythm: Normal rate.   Pulmonary:      Effort: Pulmonary effort is normal. No respiratory distress.   Musculoskeletal:      Cervical back: Normal range of motion and neck supple.      Lumbar back: Tenderness present. Normal range of motion.      Right hip: Tenderness present. No crepitus. Decreased range of motion. Decreased strength.        Legs:    Skin:     General: Skin is warm and dry.   Neurological:      Mental Status: He is alert.      Sensory: Sensation is intact.      Motor: Motor function is intact.      Coordination: Coordination is intact.      Gait: Gait is intact.   Psychiatric:         Mood and Affect: Mood normal.         Thought Content: Thought content normal.         Assessment/Plan   Problem List Items Addressed This Visit             ICD-10-CM    Right hip pain M25.551     Other Visit Diagnoses         Codes    Primary osteoarthritis of right hip    -  Primary M16.11             I nice discussion with the patient today our plan will be as follows.    Radiology: Was able to see radiological read patient has mild osteoarthritis of right hip.    Physically: We did discuss starting physical therapy he would like to choose interventional options.  Patient is very active and walks and does take stairs on a daily basis.    Psychologically: No issues at this time.    Medication: Nothing at this time.    Duration: Greater than 1 year.    Intervention: Patient is an excellent candidate for right intra-articular hip injection under fluoroscopic guidance.  Risks, benefit, and alternatives of the procedure were discussed with the patient.  Oswestry score has been compelted and recorded.        Jamaal Rawls MD 05/23/24 10:32 AM

## 2024-05-23 NOTE — PATIENT INSTRUCTIONS
What to Expect When you are having a procedure    Pending your health insurance provider, an authorization may be required before your procedure can be scheduled.    On average procedure authorization can take from 3-10 business days to obtain, however, some insurances may take up to 30 days if an appeal needs to be written or if your provider needs to talk with the insurance company to explain why the procedure needs to be performed.  Procedures are completed in one of 3 locations.   [facility]  [address], [place] [address]  [facility]  [address], [place] [address]  [facility]  [address] [place], [place] [address]  Our office staff will give you the date of your procedure and the procedure time will be provided by the location you are having your procedure at.    The schedulers will call you typically 1-2 days before your procedure to give you an arrival time and procedure time.   Procedures are typically completed with local anesthetic, to numb the skin, and take anywhere from 2-10 min to complete.    If needed light Oral or IV sedation is available, we are not able to put patients asleep for their procedure.   The hospital facilities do REQUIRE YOU TO HAVE A , however exceptions can be made for certain procedure.    We are placing numbing medication by nerves, which can cause temporary numbing to muscles in rare cases.  If you arrive with no , your procedure may be cancelled.    For most procedures, plan to be at the facility for about 1 -2 hr.    Your bedside nurse will provide post-procedure instructions to you at discharge.   jose

## 2024-06-10 ENCOUNTER — TELEPHONE (OUTPATIENT)
Dept: PAIN MEDICINE | Facility: CLINIC | Age: 64
End: 2024-06-10
Payer: COMMERCIAL

## 2024-06-11 ENCOUNTER — PREP FOR PROCEDURE (OUTPATIENT)
Dept: PAIN MEDICINE | Facility: CLINIC | Age: 64
End: 2024-06-11
Payer: COMMERCIAL

## 2024-06-11 DIAGNOSIS — M16.11 PRIMARY OSTEOARTHRITIS OF RIGHT HIP: Primary | ICD-10-CM

## 2024-06-11 RX ORDER — SODIUM CHLORIDE, SODIUM LACTATE, POTASSIUM CHLORIDE, CALCIUM CHLORIDE 600; 310; 30; 20 MG/100ML; MG/100ML; MG/100ML; MG/100ML
20 INJECTION, SOLUTION INTRAVENOUS CONTINUOUS
OUTPATIENT
Start: 2024-06-11

## 2024-07-23 ENCOUNTER — HOSPITAL ENCOUNTER (OUTPATIENT)
Dept: GASTROENTEROLOGY | Facility: HOSPITAL | Age: 64
Setting detail: OUTPATIENT SURGERY
Discharge: HOME | End: 2024-07-23
Payer: OTHER GOVERNMENT

## 2024-07-23 VITALS
SYSTOLIC BLOOD PRESSURE: 157 MMHG | BODY MASS INDEX: 34.71 KG/M2 | HEART RATE: 52 BPM | WEIGHT: 229 LBS | HEIGHT: 68 IN | DIASTOLIC BLOOD PRESSURE: 89 MMHG | OXYGEN SATURATION: 100 % | RESPIRATION RATE: 18 BRPM | TEMPERATURE: 96.8 F

## 2024-07-23 DIAGNOSIS — R10.9 UNSPECIFIED ABDOMINAL PAIN: Primary | ICD-10-CM

## 2024-07-23 PROCEDURE — 99153 MOD SED SAME PHYS/QHP EA: CPT | Performed by: INTERNAL MEDICINE

## 2024-07-23 PROCEDURE — 3700000013 HC SEDATION LEVEL 5+ TIME - EACH ADDITIONAL 15 MINUTES

## 2024-07-23 PROCEDURE — 99152 MOD SED SAME PHYS/QHP 5/>YRS: CPT | Performed by: INTERNAL MEDICINE

## 2024-07-23 PROCEDURE — 7100000010 HC PHASE TWO TIME - EACH INCREMENTAL 1 MINUTE

## 2024-07-23 PROCEDURE — 45385 COLONOSCOPY W/LESION REMOVAL: CPT | Performed by: STUDENT IN AN ORGANIZED HEALTH CARE EDUCATION/TRAINING PROGRAM

## 2024-07-23 PROCEDURE — 3700000012 HC SEDATION LEVEL 5+ TIME - INITIAL 15 MINUTES 5/> YEARS

## 2024-07-23 PROCEDURE — 43235 EGD DIAGNOSTIC BRUSH WASH: CPT | Performed by: INTERNAL MEDICINE

## 2024-07-23 PROCEDURE — 7100000009 HC PHASE TWO TIME - INITIAL BASE CHARGE

## 2024-07-23 PROCEDURE — 43235 EGD DIAGNOSTIC BRUSH WASH: CPT | Performed by: STUDENT IN AN ORGANIZED HEALTH CARE EDUCATION/TRAINING PROGRAM

## 2024-07-23 PROCEDURE — 45385 COLONOSCOPY W/LESION REMOVAL: CPT | Performed by: INTERNAL MEDICINE

## 2024-07-23 PROCEDURE — 2500000004 HC RX 250 GENERAL PHARMACY W/ HCPCS (ALT 636 FOR OP/ED): Performed by: STUDENT IN AN ORGANIZED HEALTH CARE EDUCATION/TRAINING PROGRAM

## 2024-07-23 RX ORDER — FENTANYL CITRATE 50 UG/ML
INJECTION, SOLUTION INTRAMUSCULAR; INTRAVENOUS AS NEEDED
Status: COMPLETED | OUTPATIENT
Start: 2024-07-23 | End: 2024-07-23

## 2024-07-23 RX ORDER — MIDAZOLAM HYDROCHLORIDE 1 MG/ML
INJECTION, SOLUTION INTRAMUSCULAR; INTRAVENOUS AS NEEDED
Status: COMPLETED | OUTPATIENT
Start: 2024-07-23 | End: 2024-07-23

## 2024-07-23 RX ORDER — SODIUM CHLORIDE, SODIUM LACTATE, POTASSIUM CHLORIDE, CALCIUM CHLORIDE 600; 310; 30; 20 MG/100ML; MG/100ML; MG/100ML; MG/100ML
20 INJECTION, SOLUTION INTRAVENOUS CONTINUOUS
Status: DISCONTINUED | OUTPATIENT
Start: 2024-07-23 | End: 2024-07-25 | Stop reason: HOSPADM

## 2024-07-23 ASSESSMENT — PAIN - FUNCTIONAL ASSESSMENT
PAIN_FUNCTIONAL_ASSESSMENT: 0-10

## 2024-07-23 ASSESSMENT — PAIN SCALES - GENERAL
PAINLEVEL_OUTOF10: 0 - NO PAIN
PAINLEVEL_OUTOF10: 4
PAINLEVEL_OUTOF10: 0 - NO PAIN
PAINLEVEL_OUTOF10: 2
PAINLEVEL_OUTOF10: 3
PAINLEVEL_OUTOF10: 0 - NO PAIN
PAINLEVEL_OUTOF10: 2
PAINLEVEL_OUTOF10: 0 - NO PAIN
PAINLEVEL_OUTOF10: 0 - NO PAIN
PAINLEVEL_OUTOF10: 3
PAINLEVEL_OUTOF10: 4

## 2024-07-23 ASSESSMENT — COLUMBIA-SUICIDE SEVERITY RATING SCALE - C-SSRS
6. HAVE YOU EVER DONE ANYTHING, STARTED TO DO ANYTHING, OR PREPARED TO DO ANYTHING TO END YOUR LIFE?: NO
2. HAVE YOU ACTUALLY HAD ANY THOUGHTS OF KILLING YOURSELF?: NO
1. IN THE PAST MONTH, HAVE YOU WISHED YOU WERE DEAD OR WISHED YOU COULD GO TO SLEEP AND NOT WAKE UP?: NO

## 2024-07-23 NOTE — H&P
Subjective     History of Present Illness:   Taye Gaming is a 63 y.o. male who presents to endoscopy    Physical Exam  General: not in acute distress  CV: regular rate and rhythm  Resp: non-labored breathing

## 2024-07-29 ENCOUNTER — APPOINTMENT (OUTPATIENT)
Dept: PAIN MEDICINE | Facility: CLINIC | Age: 64
End: 2024-07-29
Payer: COMMERCIAL

## 2024-07-29 LAB
LABORATORY COMMENT REPORT: NORMAL
PATH REPORT.FINAL DX SPEC: NORMAL
PATH REPORT.GROSS SPEC: NORMAL
PATH REPORT.TOTAL CANCER: NORMAL

## 2025-02-06 ENCOUNTER — APPOINTMENT (OUTPATIENT)
Dept: PRIMARY CARE | Facility: CLINIC | Age: 65
End: 2025-02-06
Payer: COMMERCIAL

## 2025-02-26 ENCOUNTER — APPOINTMENT (OUTPATIENT)
Dept: UROLOGY | Facility: CLINIC | Age: 65
End: 2025-02-26
Payer: COMMERCIAL

## 2025-02-26 NOTE — PROGRESS NOTES
Subjective   Taye Gaming is a 64 y.o. male         No past medical history on file.  Past Surgical History:   Procedure Laterality Date    MR HEAD ANGIO WO IV CONTRAST  9/22/2023    MR HEAD ANGIO WO IV CONTRAST [facility]     No family history on file.  Current Outpatient Medications   Medication Sig Dispense Refill    cholecalciferol (Vitamin D-3) 25 MCG (1000 UT) capsule Take 1 capsule (25 mcg) by mouth.  VITAMIN D3 25 MCG (1,000 UNIT) CAPSULE      tamsulosin (Flomax) 0.4 mg 24 hr capsule TAKE 1 CAPSULE BY MOUTH EVERY DAY 90 capsule 1     No current facility-administered medications for this visit.     Allergies   Allergen Reactions    Iodinated Contrast Media Itching     cramping     Social History     Socioeconomic History    Marital status: Single     Spouse name: Not on file    Number of children: Not on file    Years of education: Not on file    Highest education level: Not on file   Occupational History    Not on file   Tobacco Use    Smoking status: Never     Passive exposure: Never    Smokeless tobacco: Never   Vaping Use    Vaping status: Never Used   Substance and Sexual Activity    Alcohol use: Yes     Comment: occasionally    Drug use: Never    Sexual activity: Not on file   Other Topics Concern    Not on file   Social History Narrative    Not on file     Social Drivers of Health     Financial Resource Strain: Not on file   Food Insecurity: Not on file   Transportation Needs: Not on file   Physical Activity: Not on file   Stress: Not on file   Social Connections: Unknown (5/14/2023)    Received from [facility]    Social Network     Social Network: Not on file   Intimate Partner Violence: Unknown (4/5/2023)    Received from [facility]    HITS     Physically Hurt: Not on file     Insult or Talk Down To: Not on file     Threaten Physical Harm: Not on file     Scream or Curse: Not on file   Housing Stability: Not on file       Review of Systems  Pertinent  "items are noted in HPI.    Objective       Lab Review  Lab Results   Component Value Date    WBC 5.8 09/23/2023    RBC 4.74 09/23/2023    HGB 13.9 09/23/2023    HCT 43.3 09/23/2023     09/23/2023      Lab Results   Component Value Date    BUN 12 09/23/2023    CREATININE 1.0 09/23/2023      No results found for: \"PSA\"  Urine analysis shows {Findings; lab urine dip:59573} Micro exam: {URINE MICRO:47595}  U/A:UA is reviewed and is {URINALYSIS FINDINGS:64120}    Assessment/Plan   There are no diagnoses linked to this encounter.    Scribe Attestation  By signing my name below, I, Sima Giraldo attest that this documentation has been prepared under the direction and in the presence of Jenae Flores MD. All medical record entries made by the Scribe were at my direction or personally dictated by me. I have reviewed the chart and agree that the record accurately reflects my personal performance of the history, physical exam, discussion and plan.      "

## 2025-03-07 ENCOUNTER — APPOINTMENT (OUTPATIENT)
Dept: PRIMARY CARE | Facility: CLINIC | Age: 65
End: 2025-03-07
Payer: COMMERCIAL

## 2025-03-12 ENCOUNTER — APPOINTMENT (OUTPATIENT)
Dept: UROLOGY | Facility: CLINIC | Age: 65
End: 2025-03-12
Payer: COMMERCIAL

## 2025-03-31 ENCOUNTER — OFFICE VISIT (OUTPATIENT)
Dept: PRIMARY CARE | Facility: CLINIC | Age: 65
End: 2025-03-31
Payer: COMMERCIAL

## 2025-03-31 VITALS
TEMPERATURE: 98.3 F | HEART RATE: 89 BPM | BODY MASS INDEX: 35.16 KG/M2 | OXYGEN SATURATION: 96 % | SYSTOLIC BLOOD PRESSURE: 150 MMHG | DIASTOLIC BLOOD PRESSURE: 90 MMHG | WEIGHT: 232 LBS | HEIGHT: 68 IN | RESPIRATION RATE: 18 BRPM

## 2025-03-31 DIAGNOSIS — M16.10 ARTHRITIS OF HIP: ICD-10-CM

## 2025-03-31 DIAGNOSIS — L72.0 EPIDERMAL CYST: Primary | ICD-10-CM

## 2025-03-31 PROCEDURE — 99214 OFFICE O/P EST MOD 30 MIN: CPT | Performed by: NURSE PRACTITIONER

## 2025-03-31 PROCEDURE — 3008F BODY MASS INDEX DOCD: CPT | Performed by: NURSE PRACTITIONER

## 2025-03-31 PROCEDURE — 1036F TOBACCO NON-USER: CPT | Performed by: NURSE PRACTITIONER

## 2025-03-31 RX ORDER — MELOXICAM 15 MG/1
15 TABLET ORAL DAILY
Qty: 30 TABLET | Refills: 5 | Status: SHIPPED | OUTPATIENT
Start: 2025-03-31 | End: 2026-03-31

## 2025-03-31 RX ORDER — PRENATAL VIT 91/IRON/FOLIC/DHA 28-975-200
COMBINATION PACKAGE (EA) ORAL
COMMUNITY
Start: 2025-02-05

## 2025-03-31 RX ORDER — FINASTERIDE 5 MG/1
5 TABLET, FILM COATED ORAL DAILY
COMMUNITY
Start: 2025-01-28

## 2025-03-31 ASSESSMENT — PATIENT HEALTH QUESTIONNAIRE - PHQ9
8. MOVING OR SPEAKING SO SLOWLY THAT OTHER PEOPLE COULD HAVE NOTICED. OR THE OPPOSITE, BEING SO FIGETY OR RESTLESS THAT YOU HAVE BEEN MOVING AROUND A LOT MORE THAN USUAL: NOT AT ALL
10. IF YOU CHECKED OFF ANY PROBLEMS, HOW DIFFICULT HAVE THESE PROBLEMS MADE IT FOR YOU TO DO YOUR WORK, TAKE CARE OF THINGS AT HOME, OR GET ALONG WITH OTHER PEOPLE: NOT DIFFICULT AT ALL
2. FEELING DOWN, DEPRESSED OR HOPELESS: NEARLY EVERY DAY
4. FEELING TIRED OR HAVING LITTLE ENERGY: NOT AT ALL
7. TROUBLE CONCENTRATING ON THINGS, SUCH AS READING THE NEWSPAPER OR WATCHING TELEVISION: NOT AT ALL
5. POOR APPETITE OR OVEREATING: NOT AT ALL
6. FEELING BAD ABOUT YOURSELF - OR THAT YOU ARE A FAILURE OR HAVE LET YOURSELF OR YOUR FAMILY DOWN: NOT AT ALL
1. LITTLE INTEREST OR PLEASURE IN DOING THINGS: NEARLY EVERY DAY
SUM OF ALL RESPONSES TO PHQ QUESTIONS 1-9: 9
3. TROUBLE FALLING OR STAYING ASLEEP OR SLEEPING TOO MUCH: NEARLY EVERY DAY
9. THOUGHTS THAT YOU WOULD BE BETTER OFF DEAD, OR OF HURTING YOURSELF: NOT AT ALL
SUM OF ALL RESPONSES TO PHQ9 QUESTIONS 1 AND 2: 6

## 2025-03-31 ASSESSMENT — PAIN SCALES - GENERAL: PAINLEVEL_OUTOF10: 6

## 2025-03-31 ASSESSMENT — ENCOUNTER SYMPTOMS
COUGH: 0
MUSCULOSKELETAL NEGATIVE: 1
WHEEZING: 0
LIGHT-HEADEDNESS: 0
SHORTNESS OF BREATH: 0
FATIGUE: 0
VOMITING: 0
PSYCHIATRIC NEGATIVE: 1
PALPITATIONS: 0
LOSS OF SENSATION IN FEET: 0
DIZZINESS: 0
WEAKNESS: 0
ALLERGIC/IMMUNOLOGIC NEGATIVE: 1
HEMATOLOGIC/LYMPHATIC NEGATIVE: 1
DEPRESSION: 0
CONSTIPATION: 0
FEVER: 0
OCCASIONAL FEELINGS OF UNSTEADINESS: 1
DIAPHORESIS: 0
NAUSEA: 0
NUMBNESS: 0
HIP PAIN: 1
DIARRHEA: 0
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
## 2025-04-06 ENCOUNTER — HOSPITAL ENCOUNTER (EMERGENCY)
Facility: HOSPITAL | Age: 65
Discharge: HOME | End: 2025-04-06
Attending: STUDENT IN AN ORGANIZED HEALTH CARE EDUCATION/TRAINING PROGRAM
Payer: COMMERCIAL

## 2025-04-06 ENCOUNTER — APPOINTMENT (OUTPATIENT)
Dept: RADIOLOGY | Facility: HOSPITAL | Age: 65
End: 2025-04-06
Payer: COMMERCIAL

## 2025-04-06 VITALS
TEMPERATURE: 97.9 F | SYSTOLIC BLOOD PRESSURE: 145 MMHG | HEIGHT: 69 IN | RESPIRATION RATE: 18 BRPM | BODY MASS INDEX: 34.66 KG/M2 | HEART RATE: 57 BPM | OXYGEN SATURATION: 99 % | WEIGHT: 234 LBS | DIASTOLIC BLOOD PRESSURE: 78 MMHG

## 2025-04-06 DIAGNOSIS — N50.1 SCROTAL BLEEDING: Primary | ICD-10-CM

## 2025-04-06 PROCEDURE — 99284 EMERGENCY DEPT VISIT MOD MDM: CPT | Mod: 25 | Performed by: STUDENT IN AN ORGANIZED HEALTH CARE EDUCATION/TRAINING PROGRAM

## 2025-04-06 PROCEDURE — 2500000001 HC RX 250 WO HCPCS SELF ADMINISTERED DRUGS (ALT 637 FOR MEDICARE OP)

## 2025-04-06 PROCEDURE — 93975 VASCULAR STUDY: CPT

## 2025-04-06 PROCEDURE — 76870 US EXAM SCROTUM: CPT | Performed by: RADIOLOGY

## 2025-04-06 RX ORDER — ACETAMINOPHEN 325 MG/1
975 TABLET ORAL ONCE
Status: COMPLETED | OUTPATIENT
Start: 2025-04-06 | End: 2025-04-06

## 2025-04-06 RX ADMIN — ACETAMINOPHEN 975 MG: 325 TABLET, FILM COATED ORAL at 11:35

## 2025-04-06 ASSESSMENT — PAIN - FUNCTIONAL ASSESSMENT
PAIN_FUNCTIONAL_ASSESSMENT: 0-10

## 2025-04-06 ASSESSMENT — PAIN SCALES - GENERAL
PAINLEVEL_OUTOF10: 0 - NO PAIN

## 2025-04-06 ASSESSMENT — LIFESTYLE VARIABLES
EVER HAD A DRINK FIRST THING IN THE MORNING TO STEADY YOUR NERVES TO GET RID OF A HANGOVER: NO
HAVE YOU EVER FELT YOU SHOULD CUT DOWN ON YOUR DRINKING: NO
HAVE PEOPLE ANNOYED YOU BY CRITICIZING YOUR DRINKING: NO
TOTAL SCORE: 0
EVER FELT BAD OR GUILTY ABOUT YOUR DRINKING: NO

## 2025-04-06 ASSESSMENT — COLUMBIA-SUICIDE SEVERITY RATING SCALE - C-SSRS
6. HAVE YOU EVER DONE ANYTHING, STARTED TO DO ANYTHING, OR PREPARED TO DO ANYTHING TO END YOUR LIFE?: NO
1. IN THE PAST MONTH, HAVE YOU WISHED YOU WERE DEAD OR WISHED YOU COULD GO TO SLEEP AND NOT WAKE UP?: NO
2. HAVE YOU ACTUALLY HAD ANY THOUGHTS OF KILLING YOURSELF?: NO

## 2025-04-06 NOTE — DISCHARGE INSTRUCTIONS
You were seen in the emergency department today for evaluation of some bleeding from the scrotum.  Ultrasound came back unremarkable.  We recommend you follow-up with your primary care physician outpatient within the next 1 to 2 days.  Return to the emergency department at anytime with any new or worsening symptoms.

## 2025-04-06 NOTE — ED PROVIDER NOTES
HPI   Chief Complaint   Patient presents with    Wound Check       Patient is a 64-year-old male presenting to the emergency department for evaluation of bleeding to his scrotum.  Patient states this morning he woke up and noticed a small trickle of blood coming from his scrotum.  He denies any significant pain.  He denies any trauma or injury to the scrotum.  He states he is not diabetic.  He denies any difficulty with urination or blood in the urine.              Patient History   No past medical history on file.  Past Surgical History:   Procedure Laterality Date    MR HEAD ANGIO WO IV CONTRAST  9/22/2023    MR HEAD ANGIO WO IV CONTRAST [facility]     No family history on file.  Social History     Tobacco Use    Smoking status: Never     Passive exposure: Never    Smokeless tobacco: Never   Vaping Use    Vaping status: Never Used   Substance Use Topics    Alcohol use: Yes     Comment: occasionally    Drug use: Never       Physical Exam   ED Triage Vitals [04/06/25 1105]   Temperature Heart Rate Respirations BP   36.6 °C (97.9 °F) 68 18 152/80      Pulse Ox Temp Source Heart Rate Source Patient Position   99 % Temporal -- --      BP Location FiO2 (%)     -- --       Physical Exam  Vitals and nursing note reviewed. Exam conducted with a chaperone present.   Constitutional:       General: He is not in acute distress.     Appearance: Normal appearance. He is not ill-appearing or toxic-appearing.   HENT:      Head: Normocephalic and atraumatic.      Nose: Nose normal.      Mouth/Throat:      Mouth: Mucous membranes are moist.   Eyes:      Extraocular Movements: Extraocular movements intact.      Pupils: Pupils are equal, round, and reactive to light.   Cardiovascular:      Rate and Rhythm: Normal rate.   Pulmonary:      Effort: Pulmonary effort is normal.   Abdominal:      Palpations: Abdomen is soft.   Genitourinary:     Testes:         Left: Tenderness (Tenderness to palpation along the left scrotal sac  region.  No palpable areas of fluctuance or induration.  No active bleeding.) present.   Musculoskeletal:         General: Normal range of motion.      Cervical back: Normal range of motion.   Skin:     General: Skin is warm and dry.   Neurological:      General: No focal deficit present.      Mental Status: He is alert and oriented to person, place, and time.   Psychiatric:         Mood and Affect: Mood normal.         Behavior: Behavior normal.           ED Course & MDM   Diagnoses as of 04/06/25 1502   Scrotal bleeding                 No data recorded     Lor Coma Scale Score: 15 (04/06/25 1107 : Lola Grover RN)                           Medical Decision Making  **Disclaimer parts of this chart have been completed using voice recognition software. Please excuse any errors of transcription.     Patient seen in conjunction with attending physician Dr. Mathew.     HPI: Detailed above.    Exam: A medically appropriate exam performed, outlined above, given the known history and presentation.    History obtained from: Patient    Labs/Diagnostics:  US scrotum w doppler   Final Result   Technically limited examination with potential artifacts as reported.        Nonspecific heterogeneity right epididymis potentially due to   inflammation or scar. Attention recommended on follow-up assessment.        Right-sided hydrocele which may contain particular matter.        No evident cause for patient's symptoms. Consider urologic   consultation. CT or MRI could also be considered to further evaluate.        Can not exclude testicular microlithiasis versus artifact.        MACRO:   None        Signed by: Terrance Viera 4/6/2025 2:16 PM   Dictation workstation:   [id number]        EMERGENCY DEPARTMENT COURSE and DIFFERENTIAL DIAGNOSIS/MDM:  Patient is a 64-year-old male presenting to the emergency department for evaluation of bleeding to the scrotum.  On physical exam vital signs stable and patient is in no acute  "distress.  Patient has some tenderness to palpation in the left scrotum but no palpable abscess or area of induration.  No evidence of any pain to the perineum and no areas of necrosis.  Ultrasound of the scrotum ordered to rule out any abscess.  Ultrasound of the scrotum showed no evidence of any abscess or abnormalities.  Patient reassured and discharged in stable condition.  He was advised to follow-up with primary care physician outpatient within the next 1 to 2 days.  He will return to the emergency department with any new or worsening symptoms.    The patient presented with a chief complaint of scrotal bleeding. The differential diagnosis associated with this patient's presentation includes Radha's gangrene, abscess, skin lesion.     Vitals:    Vitals:    04/06/25 1105   BP: 152/80   Pulse: 68   Resp: 18   Temp: 36.6 °C (97.9 °F)   TempSrc: Temporal   SpO2: 99%   Weight: 106 kg (234 lb)   Height: 1.74 m (5' 8.5\")       History Limited by:    None    Independent history obtained from:    None    External records reviewed:    None    Diagnostics interpreted by me:    Ultrasound(s) see MDM    Discussions with other clinicians:    None    Chronic conditions impacting care:    None    Social determinants of health affecting care:    None    Diagnostic tests considered but not performed: None    ED Medications managed:    Medications   acetaminophen (Tylenol) tablet 975 mg (975 mg oral Given 4/6/25 1135)       Prescription drugs considered:    None    Screenings:              Procedure  Procedures     Cait Garcia PA-C  04/06/25 1502    "

## 2025-04-06 NOTE — ED TRIAGE NOTES
Pt states that he has bleeding behind his scrotum near his leg. Pt states this has never happened before but states he was having some pain there prior to the bleeding.

## 2025-04-08 PROBLEM — K05.311: Status: ACTIVE | Noted: 2025-04-08

## 2025-04-08 PROBLEM — Q63.2 PELVIC KIDNEY: Status: ACTIVE | Noted: 2018-05-23

## 2025-04-08 PROBLEM — G54.5 NEURALGIC AMYOTROPHY: Status: ACTIVE | Noted: 2025-04-08

## 2025-04-08 PROBLEM — M75.100 TEAR OF ROTATOR CUFF: Status: ACTIVE | Noted: 2021-06-03

## 2025-04-08 PROBLEM — B35.1 TINEA UNGUIUM: Status: ACTIVE | Noted: 2025-04-08

## 2025-04-08 PROBLEM — N20.9 URINARY CALCULUS, UNSPECIFIED: Status: ACTIVE | Noted: 2025-04-08

## 2025-04-08 PROBLEM — Z90.5 ABSENT KIDNEY: Status: ACTIVE | Noted: 2023-09-22

## 2025-04-08 PROBLEM — R33.9 RETENTION OF URINE, UNSPECIFIED: Status: ACTIVE | Noted: 2023-09-22

## 2025-04-08 PROBLEM — R10.9 FLANK PAIN: Status: ACTIVE | Noted: 2017-05-26

## 2025-04-08 PROBLEM — E55.9 VITAMIN D DEFICIENCY: Status: ACTIVE | Noted: 2025-04-08

## 2025-04-08 PROBLEM — K42.9 UMBILICAL HERNIA WITHOUT OBSTRUCTION AND WITHOUT GANGRENE: Status: ACTIVE | Noted: 2020-09-01

## 2025-04-08 PROBLEM — Z91.199 MEDICAL NON-COMPLIANCE: Status: ACTIVE | Noted: 2020-12-17

## 2025-04-08 PROBLEM — F41.1 GENERALIZED ANXIETY DISORDER: Status: ACTIVE | Noted: 2025-04-08

## 2025-04-08 PROBLEM — R10.9 UNSPECIFIED ABDOMINAL PAIN: Status: ACTIVE | Noted: 2025-04-08

## 2025-04-08 PROBLEM — M75.122 NONTRAUMATIC COMPLETE TEAR OF LEFT ROTATOR CUFF: Status: ACTIVE | Noted: 2021-07-02

## 2025-04-08 PROBLEM — M75.42 IMPINGEMENT SYNDROME OF LEFT SHOULDER: Status: ACTIVE | Noted: 2021-07-02

## 2025-04-08 PROBLEM — N28.89: Status: ACTIVE | Noted: 2020-12-17

## 2025-04-08 PROBLEM — R03.0 ELEVATED BP WITHOUT DIAGNOSIS OF HYPERTENSION: Status: ACTIVE | Noted: 2018-11-07

## 2025-04-08 PROBLEM — N40.0 BENIGN PROSTATIC HYPERPLASIA WITHOUT LOWER URINARY TRACT SYMPTOMS: Status: ACTIVE | Noted: 2022-09-09

## 2025-04-08 PROBLEM — H54.40 BLINDNESS OF LEFT EYE: Status: ACTIVE | Noted: 2022-09-09

## 2025-04-08 PROBLEM — L30.9 ECZEMA: Status: ACTIVE | Noted: 2021-02-23

## 2025-04-08 PROBLEM — H26.9 CATARACT: Status: ACTIVE | Noted: 2019-10-15

## 2025-04-08 PROBLEM — R10.819 ABDOMINAL TENDERNESS: Status: ACTIVE | Noted: 2023-09-22

## 2025-04-08 PROBLEM — E83.52 HYPERCALCEMIA: Status: ACTIVE | Noted: 2025-04-08

## 2025-04-08 PROBLEM — M25.519 SHOULDER PAIN: Status: ACTIVE | Noted: 2022-09-09

## 2025-04-08 PROBLEM — M19.012 DJD OF LEFT AC (ACROMIOCLAVICULAR) JOINT: Status: ACTIVE | Noted: 2021-06-03

## 2025-04-08 PROBLEM — H40.1112 PRIMARY OPEN-ANGLE GLAUCOMA, RIGHT EYE, MODERATE STAGE: Status: ACTIVE | Noted: 2025-04-08

## 2025-04-08 PROBLEM — Z90.5 ABSENT KIDNEY: Status: ACTIVE | Noted: 2025-04-08

## 2025-04-08 PROBLEM — N28.9 NONFUNCTIONING KIDNEY: Status: ACTIVE | Noted: 2020-09-01

## 2025-04-08 PROBLEM — E83.52 HYPERCALCEMIA: Status: ACTIVE | Noted: 2023-09-22

## 2025-04-08 PROBLEM — K02.52 DENTAL CARIES ON PIT AND FISSURE SURFACE PENETRATING INTO DENTIN: Status: ACTIVE | Noted: 2025-04-08

## 2025-04-08 PROBLEM — K03.6 DEPOSITS (ACCRETIONS) ON TEETH: Status: ACTIVE | Noted: 2025-04-08

## 2025-04-08 PROBLEM — H40.1123 PRIMARY OPEN-ANGLE GLAUCOMA, LEFT EYE, SEVERE STAGE: Status: ACTIVE | Noted: 2025-04-08

## 2025-04-08 PROBLEM — M75.102 ROTATOR CUFF SYNDROME OF LEFT SHOULDER: Status: ACTIVE | Noted: 2021-06-03

## 2025-04-08 PROBLEM — R11.2 NAUSEA & VOMITING: Status: ACTIVE | Noted: 2023-09-22

## 2025-04-08 PROBLEM — R42 DIZZINESS: Status: ACTIVE | Noted: 2023-09-22

## 2025-04-08 RX ORDER — LATANOPROST 50 UG/ML
SOLUTION/ DROPS OPHTHALMIC
COMMUNITY
Start: 2025-02-07 | End: 2025-04-10 | Stop reason: WASHOUT

## 2025-04-10 ENCOUNTER — OFFICE VISIT (OUTPATIENT)
Dept: SURGERY | Facility: CLINIC | Age: 65
End: 2025-04-10
Payer: COMMERCIAL

## 2025-04-10 VITALS
SYSTOLIC BLOOD PRESSURE: 140 MMHG | WEIGHT: 238 LBS | DIASTOLIC BLOOD PRESSURE: 80 MMHG | TEMPERATURE: 98 F | HEART RATE: 60 BPM | BODY MASS INDEX: 35.25 KG/M2 | HEIGHT: 69 IN | OXYGEN SATURATION: 96 %

## 2025-04-10 DIAGNOSIS — M79.89 SOFT TISSUE MASS: Primary | ICD-10-CM

## 2025-04-10 DIAGNOSIS — L72.0 EPIDERMAL CYST: ICD-10-CM

## 2025-04-10 PROCEDURE — 3079F DIAST BP 80-89 MM HG: CPT | Performed by: STUDENT IN AN ORGANIZED HEALTH CARE EDUCATION/TRAINING PROGRAM

## 2025-04-10 PROCEDURE — 99204 OFFICE O/P NEW MOD 45 MIN: CPT | Performed by: STUDENT IN AN ORGANIZED HEALTH CARE EDUCATION/TRAINING PROGRAM

## 2025-04-10 PROCEDURE — 99214 OFFICE O/P EST MOD 30 MIN: CPT | Performed by: STUDENT IN AN ORGANIZED HEALTH CARE EDUCATION/TRAINING PROGRAM

## 2025-04-10 PROCEDURE — 3077F SYST BP >= 140 MM HG: CPT | Performed by: STUDENT IN AN ORGANIZED HEALTH CARE EDUCATION/TRAINING PROGRAM

## 2025-04-10 PROCEDURE — 3008F BODY MASS INDEX DOCD: CPT | Performed by: STUDENT IN AN ORGANIZED HEALTH CARE EDUCATION/TRAINING PROGRAM

## 2025-04-10 PROCEDURE — 1036F TOBACCO NON-USER: CPT | Performed by: STUDENT IN AN ORGANIZED HEALTH CARE EDUCATION/TRAINING PROGRAM

## 2025-04-10 ASSESSMENT — PATIENT HEALTH QUESTIONNAIRE - PHQ9
SUM OF ALL RESPONSES TO PHQ9 QUESTIONS 1 AND 2: 0
2. FEELING DOWN, DEPRESSED OR HOPELESS: NOT AT ALL
1. LITTLE INTEREST OR PLEASURE IN DOING THINGS: NOT AT ALL

## 2025-04-10 ASSESSMENT — ENCOUNTER SYMPTOMS
BRUISES/BLEEDS EASILY: 0
FEVER: 0
DYSURIA: 0
SORE THROAT: 0
WOUND: 0
HEADACHES: 0
CHILLS: 0
FACIAL ASYMMETRY: 0
VOMITING: 0
ADENOPATHY: 0
CHEST TIGHTNESS: 0
HEMATURIA: 0
BLOOD IN STOOL: 0
SPEECH DIFFICULTY: 0
ARTHRALGIAS: 0
PALPITATIONS: 0
TROUBLE SWALLOWING: 0
NAUSEA: 0
UNEXPECTED WEIGHT CHANGE: 0
DIARRHEA: 0
SHORTNESS OF BREATH: 0
ABDOMINAL PAIN: 0
VOICE CHANGE: 0

## 2025-04-10 ASSESSMENT — PAIN SCALES - GENERAL: PAINLEVEL_OUTOF10: 0-NO PAIN

## 2025-04-10 NOTE — Clinical Note
Hi,  Can you please schedule this patient for a lipoma removal x2 with Dr. Browning at [facility]  on May 7, 2025 .   Thank you   Reina Lincoln LPN

## 2025-04-10 NOTE — PATIENT INSTRUCTIONS
Thank you for scheduling surgery with Dr. Browning   Below you will find your Surgery Itinerary to include dates, times, and locations for appointments involved with your procedure.      A representative from the hospital will contact you directly to schedule any Pre Admission Testing appointments needed.    On the day before the scheduled surgery, please call the Same Day Surgery department between 2-4 pm for a time of arrival for the day of procedure.  [facility] [phone]    Nothing to eat or drink after midnight the night before surgery    Surgery with Dr. Browning at [facility] - [address], [place] [address]  On:  Wednesday May 7, 2025    Postoperative appointment is scheduled at Surgery office locatoins: [facility] [address], [place] [address] [phone]                   On: Tuesday May 20, 2025 @ 10 am     *Please note, you may receive a call from our financial counselors if you have a financial liability greater than $250.           [facility]  Pre - Operative Instructions     Your time of arrival for surgery is available the day before surgery. *If the surgery is on a Monday, or the Tuesday following a Monday Holiday, please call Same Day Surgery the Friday before your surgery date.*    DO NOT EAT OR DRINK ANYTHING AFTER MIDNIGHT THE NIGHT BEFORE SURGERY. This includes any beverages (coffee, water, soda, etc.), hard candy, gum or mints. If this is not followed, surgery may be canceled. Please avoid eating a large meal the evening before surgery. Please do not DRINK ALCOHOL or SMOKE FOR 24 HOURS before surgery.     Insulin Instructions - Please do not take any short acting Insulin (Regular or NPH). Do not take any oral diabetic medication on the day of surgery. Long acting Insulins may be taken (Lantus). We will check your blood sugar and administer at the hospital the day of surgery. Patient who have Insulin  pumps are to make NO adjustments.     Prescription Medications - You are encouraged to take prescription medications including heart, blood pressure, anti-seizure, anxiety, breathing medications (including inhalers) with exception to diabetic medications prior to arriving at the hospital the day of surgery. You may take prescribed pain medications as needed. Please remember the dose and time taken so we may inform your Anesthesiologist.     Please bring the name, dosage, and frequency of your medications if you did not provide these on the day of Pre Admission Testing. You may bring the actual bottles if this would be easier for you.     Please bring your prescribed inhalers with you the morning of surgery.     Patients on Anti-platelet and Anticoagulant agents, please read the following:  ASA, NSAIDS stop 5 days prior to surgery  Coumadin stop 5 days prior to surgery unless bridging therapy is needed (metal valve replacement, cardiac stent placement) - if so please speak to your Cardiologist or prescribing physician.   Other anti-platelet and anticoagulant agents:  Plavix (Clopidogrel) stop 5 days prior to surgery  Brilinta (Ticagrelor) stop 5 days prior to surgery   Effient (Prasugrel) stop 7 days prior to surgery   Lovenox (Enoxaparin) stop 24 hours prior to surgery  Arixtra (Fondaparinux) stop 5 days prior to surgery  Xarelto (Rivaroxaban) stop 3 days prior to surgery  Pradaxa (Dabigatran) stop 5 days prior to surgery  Eliquis (Apixaban) stop 3 days prior to surgery   Savaysa (Endoxaban) stop days prior to surgery     Patients on GLP-inhibitors  oral and injectable along with SGLT2 inhibitors please read the following:   SGLT2 inhibitors:        Ertugliflozin (Steglatro)- Stop a full 4 days before surgical/procedure date  Bexagliflozin (Brenzavvy)- Stop a full 3 days before surgical/procedure date  Canagliflozin (Invokana)- Stop a full 3 days before surgical/procedure date  Dapagliflozin (Farxiga)- Stop a full 3  days before surgical/procedure date  Emagliflozin (Jardiance) - Stop a full 3 days before surgical/procedure date    GLP-1 Inhibitors oral:  Semaglutide (Rybelus)- Hold day of surgery only     GLP-1 Inhibitors Injection weekly:  Dulaglutide (Trulicity) -Stop a full 7 days before surgical/procedure date   Exenatide ER (Bydyreon, Bcise)-Stop a full 7 days before surgical/procedure date   Semiglutide (Ozempic, Wegovy)-Stop a full 7 days before surgical/procedure date     GLP-1 inhibitors injection twice a day:  Exenatide IR (Byetta)- Hold day of surgery only    GLP-1 inhibitors injection daily:  Liraglutide (Saxenda, Victoza)- Hold day of surgery only  Lixisenatide (Adlyxin) -Hold day of surgery only     Please stop all herbal medications 2 weeks prior to surgery     C-PAP Devices - If you have a C-PAP device at home, bring it with you on our day of surgery if your surgery requires you to stay overnight.     Please bring a copy of any Advanced Directives the day of surgery if you did not provide it at Pre Admission Testing. These documents are living forde and durable power of  for healthcare.     Please notify your physician/surgeon if you develop a cold, sore throat, fever, flu symptoms, COVID symptoms or any changes in your physical conditions.     A shower or bath is preferred the evening before or the morning of surgery.     Remove jewelry before admission to the hospital. It is no longer permitted to tape rings. We ask that you leave all valuables at home. Any items of value will be given to a family member or locked up by security.   If you have a body piercing g that you cannot remove, it is recommended that you have it removed professionally and have a plastic spacer inserted. There is a risk for surgical burns with jewelry left in place.     Remove or wear minimal makeup the day of surgery. You will be asked to remove glasses and contact lenses prior to surgery. Please bring a glass case and/or  contact lens case with you. These items are not provided.     Dentures and partials are usually removed prior to surgery. A denture cup will be provided for you.       You may be asked to remove nail polish. Acrylic nails are now acceptable.     Wear loose comfortable clothing that you will be able to fit over bandages when you leave the hospitals (as appropriate for your surgery).    Patients that are under the age of 18 years must have a parent or guardian present the day of surgery.     Family members of significant others may stay with you on the Same Day Surgery unit. While you are in surgery, they may wait for you in the family waiting area. The physician will speak to the waiting family, if permitted by the patient, after surgery.     Changes or delays in the surgery schedule may occur due to emergencies. The hospital will notify you if this occurs. We apologize for any inconveniences this may present.     You must have a responsible  available to drive you home after surgery. You will not be permitted to drive yourself home after surgery if you have received any anesthesia or sedation during your procedure.     Please visit our website at [email or web address] for more information regarding [facility] services.      For questions about your Pre Admission Testing (PAT)  [facility] [phone]  [facility] [phone]    Thank you for choosing [facility]!

## 2025-04-10 NOTE — PROGRESS NOTES
History Of Present Illness  Taye Gaming is a 64 y.o. male presenting for evaluation of 2 soft tissue mass on his back.  He has noticed them for a couple years.  The first 1 on the right side was brought to his attention during a massage.  He then noticed a similar finding on his left shoulder blade and he reports they do not bother him.  There is no pain.  He would like them removed given their appearance     Past Medical History  He has no past medical history on file.    Surgical History  He has a past surgical history that includes MR angio head wo IV contrast (09/22/2023) and Colonoscopy (N/A, 07/23/2024).     Social History  He reports that he has never smoked. He has never been exposed to tobacco smoke. He has never used smokeless tobacco. He reports current alcohol use. He reports that he does not use drugs.    Family History  No family history on file.     Allergies  Furosemide, Gabapentin, Iodinated contrast media, Omeprazole, Pork derived (porcine), and Tramadol    Review of Systems   Constitutional:  Negative for chills, fever and unexpected weight change.   HENT:  Negative for sneezing, sore throat, trouble swallowing and voice change.    Respiratory:  Negative for chest tightness and shortness of breath.    Cardiovascular:  Negative for chest pain and palpitations.   Gastrointestinal:  Negative for abdominal pain, blood in stool, diarrhea, nausea and vomiting.   Endocrine: Negative for cold intolerance and heat intolerance.   Genitourinary:  Negative for decreased urine volume, dysuria and hematuria.   Musculoskeletal:  Negative for arthralgias and gait problem.   Skin:  Negative for rash and wound.   Neurological:  Negative for facial asymmetry, speech difficulty and headaches.   Hematological:  Negative for adenopathy. Does not bruise/bleed easily.   Psychiatric/Behavioral:  Negative for self-injury and suicidal ideas.         Physical Exam  Vitals and nursing note reviewed.   Constitutional:        "Appearance: Normal appearance.   HENT:      Head: Normocephalic and atraumatic.      Mouth/Throat:      Mouth: Mucous membranes are moist.      Pharynx: Oropharynx is clear.   Eyes:      Extraocular Movements: Extraocular movements intact.      Pupils: Pupils are equal, round, and reactive to light.   Cardiovascular:      Rate and Rhythm: Normal rate and regular rhythm.      Pulses: Normal pulses.   Pulmonary:      Effort: Pulmonary effort is normal.      Breath sounds: Normal breath sounds.   Abdominal:      General: There is no distension.      Palpations: Abdomen is soft.      Tenderness: There is no abdominal tenderness.   Musculoskeletal:      Cervical back: Normal range of motion and neck supple.   Skin:     General: Skin is warm and dry.      Comments: Tissue mass over the right upper back ~3cm and left upper back ~5cm   Neurological:      General: No focal deficit present.      Mental Status: He is alert and oriented to person, place, and time.   Psychiatric:         Mood and Affect: Mood normal.         Behavior: Behavior normal.          Last Recorded Vitals  Blood pressure 140/80, pulse 60, temperature 36.7 °C (98 °F), temperature source Oral, height 1.74 m (5' 8.5\"), weight 108 kg (238 lb), SpO2 96%.    Relevant Results    No related imaging     Assessment/Plan   Problem List Items Addressed This Visit    None  Visit Diagnoses         Codes    Soft tissue mass    -  Primary M79.89    Relevant Orders    Case Request Operating Room: EXCISION, NEOPLASM, SOFT TISSUE, SUBCUTANEOUS (Completed)    Epidermal cyst     L72.0          Soft tissue mass of the right and left upper back.  At this point they are not bothersome, but he does notice the bulge and I explained that sometimes they can grow larger over time.  He does desire removal.  I have recommended we do this in the operating room.  I described the procedure in detail including postoperative expectations in terms of pain control and wound care.  All " questions were answered.  He does have to get some imaging and biopsy of his prostate, Will schedule at his convenience.      Uzma Browning MD

## 2025-05-01 ENCOUNTER — LAB (OUTPATIENT)
Dept: LAB | Facility: HOSPITAL | Age: 65
End: 2025-05-01
Payer: COMMERCIAL

## 2025-05-01 ENCOUNTER — PRE-ADMISSION TESTING (OUTPATIENT)
Dept: PREADMISSION TESTING | Facility: HOSPITAL | Age: 65
End: 2025-05-01
Payer: COMMERCIAL

## 2025-05-01 VITALS
OXYGEN SATURATION: 100 % | DIASTOLIC BLOOD PRESSURE: 78 MMHG | HEIGHT: 69 IN | HEART RATE: 52 BPM | TEMPERATURE: 98.6 F | BODY MASS INDEX: 36.07 KG/M2 | WEIGHT: 243.5 LBS | SYSTOLIC BLOOD PRESSURE: 167 MMHG

## 2025-05-01 DIAGNOSIS — Z01.818 ENCOUNTER FOR OTHER PREPROCEDURAL EXAMINATION: Primary | ICD-10-CM

## 2025-05-01 DIAGNOSIS — Z01.818 PRE-OP EVALUATION: Primary | ICD-10-CM

## 2025-05-01 LAB
ANION GAP SERPL CALCULATED.3IONS-SCNC: 8 MMOL/L (ref 10–20)
BASOPHILS # BLD AUTO: 0.02 X10*3/UL (ref 0–0.1)
BASOPHILS NFR BLD AUTO: 0.4 %
BUN SERPL-MCNC: 18 MG/DL (ref 6–23)
CALCIUM SERPL-MCNC: 10.6 MG/DL (ref 8.6–10.3)
CHLORIDE SERPL-SCNC: 109 MMOL/L (ref 98–107)
CO2 SERPL-SCNC: 30 MMOL/L (ref 21–32)
CREAT SERPL-MCNC: 1.29 MG/DL (ref 0.5–1.3)
EGFRCR SERPLBLD CKD-EPI 2021: 62 ML/MIN/1.73M*2
EOSINOPHIL # BLD AUTO: 0.09 X10*3/UL (ref 0–0.7)
EOSINOPHIL NFR BLD AUTO: 2 %
ERYTHROCYTE [DISTWIDTH] IN BLOOD BY AUTOMATED COUNT: 13.2 % (ref 11.5–14.5)
GLUCOSE SERPL-MCNC: 99 MG/DL (ref 74–99)
HCT VFR BLD AUTO: 42.4 % (ref 41–52)
HGB BLD-MCNC: 13.7 G/DL (ref 13.5–17.5)
IMM GRANULOCYTES # BLD AUTO: 0.02 X10*3/UL (ref 0–0.7)
IMM GRANULOCYTES NFR BLD AUTO: 0.4 % (ref 0–0.9)
LYMPHOCYTES # BLD AUTO: 1.09 X10*3/UL (ref 1.2–4.8)
LYMPHOCYTES NFR BLD AUTO: 23.8 %
MCH RBC QN AUTO: 30.7 PG (ref 26–34)
MCHC RBC AUTO-ENTMCNC: 32.3 G/DL (ref 32–36)
MCV RBC AUTO: 95 FL (ref 80–100)
MONOCYTES # BLD AUTO: 0.48 X10*3/UL (ref 0.1–1)
MONOCYTES NFR BLD AUTO: 10.5 %
NEUTROPHILS # BLD AUTO: 2.88 X10*3/UL (ref 1.2–7.7)
NEUTROPHILS NFR BLD AUTO: 62.9 %
NRBC BLD-RTO: 0 /100 WBCS (ref 0–0)
PLATELET # BLD AUTO: 183 X10*3/UL (ref 150–450)
POTASSIUM SERPL-SCNC: 4.9 MMOL/L (ref 3.5–5.3)
RBC # BLD AUTO: 4.46 X10*6/UL (ref 4.5–5.9)
SODIUM SERPL-SCNC: 142 MMOL/L (ref 136–145)
WBC # BLD AUTO: 4.6 X10*3/UL (ref 4.4–11.3)

## 2025-05-01 PROCEDURE — 85025 COMPLETE CBC W/AUTO DIFF WBC: CPT

## 2025-05-01 PROCEDURE — 99204 OFFICE O/P NEW MOD 45 MIN: CPT

## 2025-05-01 PROCEDURE — 36415 COLL VENOUS BLD VENIPUNCTURE: CPT

## 2025-05-01 PROCEDURE — 80048 BASIC METABOLIC PNL TOTAL CA: CPT

## 2025-05-01 RX ORDER — MELOXICAM 15 MG/1
15 TABLET ORAL DAILY
COMMUNITY
Start: 2025-04-27

## 2025-05-01 ASSESSMENT — DUKE ACTIVITY SCORE INDEX (DASI)
CAN YOU DO MODERATE WORK AROUND THE HOUSE LIKE VACUUMING, SWEEPING FLOORS OR CARRYING GROCERIES: YES
CAN YOU DO HEAVY WORK AROUND THE HOUSE LIKE SCRUBBING FLOORS OR LIFTING AND MOVING HEAVY FURNITURE: YES
CAN YOU PARTICIPATE IN STRENOUS SPORTS LIKE SWIMMING, SINGLES TENNIS, FOOTBALL, BASKETBALL, OR SKIING: NO
CAN YOU DO LIGHT WORK AROUND THE HOUSE LIKE DUSTING OR WASHING DISHES: YES
CAN YOU WALK INDOORS, SUCH AS AROUND YOUR HOUSE: YES
CAN YOU TAKE CARE OF YOURSELF (EAT, DRESS, BATHE, OR USE TOILET): YES
CAN YOU DO YARD WORK LIKE RAKING LEAVES, WEEDING OR PUSHING A MOWER: YES
DASI METS SCORE: 9
CAN YOU CLIMB A FLIGHT OF STAIRS OR WALK UP A HILL: YES
CAN YOU WALK A BLOCK OR TWO ON LEVEL GROUND: YES
CAN YOU PARTICIPATE IN MODERATE RECREATIONAL ACTIVITIES LIKE GOLF, BOWLING, DANCING, DOUBLES TENNIS OR THROWING A BASEBALL OR FOOTBALL: YES
CAN YOU RUN A SHORT DISTANCE: YES
TOTAL_SCORE: 50.7
CAN YOU HAVE SEXUAL RELATIONS: YES

## 2025-05-01 ASSESSMENT — ENCOUNTER SYMPTOMS
HEMATOLOGIC/LYMPHATIC NEGATIVE: 1
ENDOCRINE NEGATIVE: 1
ALLERGIC/IMMUNOLOGIC NEGATIVE: 1
MUSCULOSKELETAL NEGATIVE: 1
PSYCHIATRIC NEGATIVE: 1
GASTROINTESTINAL NEGATIVE: 1
NEUROLOGICAL NEGATIVE: 1
EYES NEGATIVE: 1
RESPIRATORY NEGATIVE: 1
CONSTITUTIONAL NEGATIVE: 1

## 2025-05-01 ASSESSMENT — PAIN SCALES - GENERAL: PAINLEVEL_OUTOF10: 0 - NO PAIN

## 2025-05-01 ASSESSMENT — PAIN - FUNCTIONAL ASSESSMENT: PAIN_FUNCTIONAL_ASSESSMENT: 0-10

## 2025-05-01 NOTE — CPM/PAT H&P
CPM/PAT Evaluation       Name: Taye Gaming (Taye Gaming)  /Age: 1960/64 y.o.     In-Person       Chief Complaint: Soft tissue mass    HPI: Taye Gaming is a 64 year old male with complaints of two soft tissue masses on his back.He states he has one on the right back shoulder blade and one on the left back . He states the one on the right was discovered in  and believes it may have grown in size. He denies pain.  He is scheduled for a soft tissue excision. He denies fever, chills, nausea, vomiting, chest pain, sob, dizziness, and palpitations.    Medical History[1]    Surgical History[2]    Social History     Tobacco Use    Smoking status: Never     Passive exposure: Never    Smokeless tobacco: Never   Substance Use Topics    Alcohol use: Yes     Alcohol/week: 14.0 standard drinks of alcohol     Types: 14 Cans of beer per week     Comment: occasionally     Social History     Substance and Sexual Activity   Drug Use Yes    Types: Marijuana    Comment: SMOKE EVERY OTHER DAY         Family History[3]    Allergies[4]  Current Medications[5]    Review of Systems   Constitutional: Negative.    HENT: Negative.     Eyes: Negative.    Respiratory: Negative.     Gastrointestinal: Negative.    Endocrine: Negative.    Genitourinary: Negative.    Musculoskeletal: Negative.    Skin:         Two back soft tissue masses   Allergic/Immunologic: Negative.    Neurological: Negative.    Hematological: Negative.    Psychiatric/Behavioral: Negative.                Physical Exam  Vitals reviewed.   Constitutional:       Appearance: Normal appearance.   HENT:      Head: Normocephalic and atraumatic.      Nose: Nose normal.      Mouth/Throat:      Mouth: Mucous membranes are moist.      Pharynx: Oropharynx is clear.   Eyes:      Extraocular Movements: Extraocular movements intact.      Conjunctiva/sclera: Conjunctivae normal.      Pupils: Pupils are equal, round, and reactive to light.   Cardiovascular:      Rate and Rhythm:  "Normal rate and regular rhythm.      Pulses: Normal pulses.      Heart sounds: Normal heart sounds.   Pulmonary:      Effort: Pulmonary effort is normal.      Breath sounds: Normal breath sounds.   Abdominal:      General: Bowel sounds are normal.      Palpations: Abdomen is soft.   Genitourinary:     Comments: Assessment deferred to physician  Musculoskeletal:         General: Normal range of motion.      Cervical back: Normal range of motion and neck supple.   Skin:     General: Skin is warm and dry.      Comments: Right back shoulder blade soft tissue mass  Left back under shoulder blade soft tissue mass   Neurological:      General: No focal deficit present.      Mental Status: He is alert and oriented to person, place, and time.   Psychiatric:         Mood and Affect: Mood normal.         Behavior: Behavior normal.         Thought Content: Thought content normal.         Judgment: Judgment normal.          PAT AIRWAY:   Airway:     Mallampati::  III    TM distance::  >3 FB    Neck ROM::  Full  normal          Visit Vitals  /78   Pulse 52   Temp 37 °C (98.6 °F) (Temporal)   Ht 1.753 m (5' 9\")   Wt 110 kg (243 lb 8 oz)   SpO2 100%   BMI 35.96 kg/m²   Smoking Status Never   BSA 2.31 m²     ASA: I  CHADS2: 1.9%  RCRI: 0.4%  DASI:50.7  METS:9  STOP BAN        Assessment and Plan:     Soft tissue mass : EXCISION, NEOPLASM, SOFT TISSUE, SUBCUTANEOUS   Nephrolithiasis: per patient he has a history of 12 lithotripsy procedures. Left nephrectomy 2020  Elevated [facility]  BMI: 35.96    LABS: CBC, BMP ordered in PAT  [facility]-ANGEL               [1]   Past Medical History:  Diagnosis Date    Elevated PSA     Hernia of abdominal wall     Nephrolithiasis    [2]   Past Surgical History:  Procedure Laterality Date    COLONOSCOPY N/A 2024    Dr. Carrillo    CYSTOSCOPY      KIDNEY STONE SURGERY      LITHOTRIPSY      MR HEAD ANGIO WO IV CONTRAST  2023    MR HEAD " ANGIO WO IV CONTRAST [facility]    NEPHRECTOMY Left    [3] No family history on file.  [4]   Allergies  Allergen Reactions    Furosemide Other     Severe cramping    Gabapentin Rash    Iodinated Contrast Media Itching     cramping    Omeprazole Other    Pork Derived (Porcine) Unknown    Tramadol Nausea/vomiting   [5]   Current Outpatient Medications   Medication Sig Dispense Refill    meloxicam (Mobic) 15 mg tablet Take 1 tablet (15 mg) by mouth once daily.      finasteride (Proscar) 5 mg tablet Take 1 tablet (5 mg) by mouth once daily.      tamsulosin (Flomax) 0.4 mg 24 hr capsule TAKE 1 CAPSULE BY MOUTH EVERY DAY 90 capsule 1    terbinafine (LamISIL) 1 % cream Apply topically.       No current facility-administered medications for this visit.

## 2025-05-01 NOTE — PREPROCEDURE INSTRUCTIONS
Medication List            Accurate as of May 1, 2025 10:05 AM. Always use your most recent med list.                finasteride 5 mg tablet  Commonly known as: Proscar  Medication Adjustments for Surgery: Take/Use as prescribed     meloxicam 15 mg tablet  Commonly known as: Mobic  Additional Medication Adjustments for Surgery: Take last dose 7 days before surgery     tamsulosin 0.4 mg 24 hr capsule  Commonly known as: Flomax  TAKE 1 CAPSULE BY MOUTH EVERY DAY  Medication Adjustments for Surgery: Take/Use as prescribed     terbinafine 1 % cream  Commonly known as: LamISIL  Medication Adjustments for Surgery: Take/Use as prescribed                              NPO Instructions:    Do not eat any food after midnight the night before your surgery/procedure.        Why must I stop eating and drinking near surgery time?  With sedation, food or liquid in your stomach can enter your lungs causing serious complications  Increases nausea and vomiting    When do I need to stop eating and drinking before my surgery?   Do not eat or drink after midnight the night before your surgery/procedure.  You may have small sips of water to take your medication.  Additional Instructions:     Day of Surgery:  Wear  comfortable loose fitting clothing  Do not use moisturizers, creams, lotions or perfume  All jewelry and valuables should be left at home      PAT DISCHARGE INSTRUCTIONS    Please call the Same Day Surgery (SDS) Department of the hospital where your procedure will be performed after 2:00 PM the day before your surgery. If you are scheduled on a Monday, or a Tuesday following a Monday holiday, you will need to call on the last business day prior to your surgery.        [facility]  [address] [place] [address], [address] [phone]  Second Floor      Please let your surgeon know if:      You develop any open sores, shingles, burning or painful urination as these may increase your risk of  an infection.   You no longer wish to have the surgery.   Any other personal circumstances change that may lead to the need to cancel or defer this surgery-such as being sick or getting admitted to any hospital within one week of your planned procedure.    Your contact details change, such as a change of address or phone number.    Starting now:     Please DO NOT drink alcohol or smoke for 24 hours before surgery. It is well known that quitting smoking can make a huge difference to your health and recovery from surgery. The longer you abstain from smoking, the better your chances of a healthy recovery. If you need help with quitting, call 1-800-QUIT-NOW to be connected to a trained counselor who will discuss the best methods to help you quit.     Before your surgery:    Please stop all supplements INCLUDING MARIJUANA 7 days prior to surgery. Or as directed by your surgeon.   Please stop taking NSAID pain medicine such as Advil and Motrin 7 days before surgery.    If you develop any fever, cough, cold, rashes, cuts, scratches, scrapes, urinary symptoms or infection anywhere on your body (including teeth and gums) prior to surgery, please call your surgeon’s office as soon as possible. This may require treatment to reduce the chance of cancellation on the day of surgery.    The day before your surgery:   DIET- Please follow the diet instructions at the top of your packet.   Get a good night’s rest.  Use the special soap for bathing if you have been instructed to use one.    Scheduled surgery times may change and you will be notified if this occurs - please check your personal voicemail for any updates.     On the morning of surgery:   Wear comfortable, loose fitting clothes which open in the front. Please do not wear moisturizers, creams, lotions, makeup or perfume.    Please bring with you to surgery:   Photo ID and insurance card   Current list of medicines and allergies   Pacemaker/ Defibrillator/Heart stent  cards   CPAP machine and mask    Slings/ splints/ crutches   A copy of your complete advanced directive/DHPOA.    Please do NOT bring with you to surgery:   All jewelry and valuables should be left at home.   Prosthetic devices such as contact lenses, hearing aids, dentures, eyelash extensions, hairpins and body piercings must be removed prior to going in to the surgical suite.    After outpatient surgery:   A responsible adult MUST accompany you at the time of discharge and stay with you for 24 hours after your surgery. You may NOT drive yourself home after surgery.    Do not drive, operate machinery, make critical decisions or do activities that require co-ordination or balance until after a night’s sleep.   Do not drink alcoholic beverages for 24 hours.   Instructions for resuming your medications will be provided by your surgeon.    CALL YOUR DOCTOR AFTER SURGERY IF YOU HAVE:     Chills and/or a fever of 101° F or higher.    Redness, swelling, pus or drainage from your surgical wound or a bad smell from the wound.    Lightheadedness, fainting or confusion.    Persistent vomiting (throwing up) and are not able to eat or drink for 12 hours.    Three or more loose, watery bowel movements in 24 hours (diarrhea).   Difficulty or pain while urinating( after non-urological surgery)    Pain and swelling in your legs, especially if it is only on one side.    Difficulty breathing or are breathing faster than normal.    Any new concerning symptoms.

## 2025-05-07 ENCOUNTER — ANESTHESIA (OUTPATIENT)
Dept: OPERATING ROOM | Facility: HOSPITAL | Age: 65
End: 2025-05-07
Payer: COMMERCIAL

## 2025-05-07 ENCOUNTER — HOSPITAL ENCOUNTER (OUTPATIENT)
Facility: HOSPITAL | Age: 65
Setting detail: OUTPATIENT SURGERY
Discharge: HOME | End: 2025-05-07
Attending: STUDENT IN AN ORGANIZED HEALTH CARE EDUCATION/TRAINING PROGRAM | Admitting: STUDENT IN AN ORGANIZED HEALTH CARE EDUCATION/TRAINING PROGRAM
Payer: COMMERCIAL

## 2025-05-07 ENCOUNTER — TELEPHONE (OUTPATIENT)
Dept: SURGERY | Facility: CLINIC | Age: 65
End: 2025-05-07

## 2025-05-07 ENCOUNTER — ANESTHESIA EVENT (OUTPATIENT)
Dept: OPERATING ROOM | Facility: HOSPITAL | Age: 65
End: 2025-05-07
Payer: COMMERCIAL

## 2025-05-07 VITALS
HEART RATE: 83 BPM | TEMPERATURE: 97 F | SYSTOLIC BLOOD PRESSURE: 153 MMHG | DIASTOLIC BLOOD PRESSURE: 75 MMHG | RESPIRATION RATE: 16 BRPM | OXYGEN SATURATION: 98 %

## 2025-05-07 DIAGNOSIS — M79.89 SOFT TISSUE MASS: Primary | ICD-10-CM

## 2025-05-07 PROCEDURE — 21931 EXC BACK LES SC 3 CM/>: CPT | Performed by: STUDENT IN AN ORGANIZED HEALTH CARE EDUCATION/TRAINING PROGRAM

## 2025-05-07 PROCEDURE — 7100000002 HC RECOVERY ROOM TIME - EACH INCREMENTAL 1 MINUTE: Performed by: STUDENT IN AN ORGANIZED HEALTH CARE EDUCATION/TRAINING PROGRAM

## 2025-05-07 PROCEDURE — A21931 PR EXCISION TUMOR SOFT TISSUE BACK/FLANK SUBQ 3+CM: Performed by: ANESTHESIOLOGY

## 2025-05-07 PROCEDURE — 3600000008 HC OR TIME - EACH INCREMENTAL 1 MINUTE - PROCEDURE LEVEL THREE: Performed by: STUDENT IN AN ORGANIZED HEALTH CARE EDUCATION/TRAINING PROGRAM

## 2025-05-07 PROCEDURE — 3700000002 HC GENERAL ANESTHESIA TIME - EACH INCREMENTAL 1 MINUTE: Performed by: STUDENT IN AN ORGANIZED HEALTH CARE EDUCATION/TRAINING PROGRAM

## 2025-05-07 PROCEDURE — 2500000001 HC RX 250 WO HCPCS SELF ADMINISTERED DRUGS (ALT 637 FOR MEDICARE OP): Performed by: ANESTHESIOLOGY

## 2025-05-07 PROCEDURE — 2500000004 HC RX 250 GENERAL PHARMACY W/ HCPCS (ALT 636 FOR OP/ED): Mod: JZ | Performed by: STUDENT IN AN ORGANIZED HEALTH CARE EDUCATION/TRAINING PROGRAM

## 2025-05-07 PROCEDURE — A21931 PR EXCISION TUMOR SOFT TISSUE BACK/FLANK SUBQ 3+CM: Performed by: ANESTHESIOLOGIST ASSISTANT

## 2025-05-07 PROCEDURE — 7100000010 HC PHASE TWO TIME - EACH INCREMENTAL 1 MINUTE: Performed by: STUDENT IN AN ORGANIZED HEALTH CARE EDUCATION/TRAINING PROGRAM

## 2025-05-07 PROCEDURE — 7100000001 HC RECOVERY ROOM TIME - INITIAL BASE CHARGE: Performed by: STUDENT IN AN ORGANIZED HEALTH CARE EDUCATION/TRAINING PROGRAM

## 2025-05-07 PROCEDURE — 2500000004 HC RX 250 GENERAL PHARMACY W/ HCPCS (ALT 636 FOR OP/ED): Mod: JZ | Performed by: ANESTHESIOLOGY

## 2025-05-07 PROCEDURE — 7100000009 HC PHASE TWO TIME - INITIAL BASE CHARGE: Performed by: STUDENT IN AN ORGANIZED HEALTH CARE EDUCATION/TRAINING PROGRAM

## 2025-05-07 PROCEDURE — 2500000004 HC RX 250 GENERAL PHARMACY W/ HCPCS (ALT 636 FOR OP/ED): Mod: JW | Performed by: ANESTHESIOLOGIST ASSISTANT

## 2025-05-07 PROCEDURE — 3600000003 HC OR TIME - INITIAL BASE CHARGE - PROCEDURE LEVEL THREE: Performed by: STUDENT IN AN ORGANIZED HEALTH CARE EDUCATION/TRAINING PROGRAM

## 2025-05-07 PROCEDURE — A4649 SURGICAL SUPPLIES: HCPCS | Performed by: STUDENT IN AN ORGANIZED HEALTH CARE EDUCATION/TRAINING PROGRAM

## 2025-05-07 PROCEDURE — 2500000004 HC RX 250 GENERAL PHARMACY W/ HCPCS (ALT 636 FOR OP/ED): Performed by: STUDENT IN AN ORGANIZED HEALTH CARE EDUCATION/TRAINING PROGRAM

## 2025-05-07 PROCEDURE — 2720000007 HC OR 272 NO HCPCS: Performed by: STUDENT IN AN ORGANIZED HEALTH CARE EDUCATION/TRAINING PROGRAM

## 2025-05-07 PROCEDURE — 88304 TISSUE EXAM BY PATHOLOGIST: CPT | Mod: TC,WESLAB | Performed by: STUDENT IN AN ORGANIZED HEALTH CARE EDUCATION/TRAINING PROGRAM

## 2025-05-07 PROCEDURE — 3700000001 HC GENERAL ANESTHESIA TIME - INITIAL BASE CHARGE: Performed by: STUDENT IN AN ORGANIZED HEALTH CARE EDUCATION/TRAINING PROGRAM

## 2025-05-07 RX ORDER — HYDRALAZINE HYDROCHLORIDE 20 MG/ML
5 INJECTION INTRAMUSCULAR; INTRAVENOUS EVERY 30 MIN PRN
Status: DISCONTINUED | OUTPATIENT
Start: 2025-05-07 | End: 2025-05-07

## 2025-05-07 RX ORDER — HYDROCODONE BITARTRATE AND ACETAMINOPHEN 10; 325 MG/1; MG/1
1 TABLET ORAL EVERY 6 HOURS PRN
Qty: 10 TABLET | Refills: 0 | Status: SHIPPED | OUTPATIENT
Start: 2025-05-07 | End: 2025-05-14

## 2025-05-07 RX ORDER — PROPOFOL 10 MG/ML
INJECTION, EMULSION INTRAVENOUS AS NEEDED
Status: DISCONTINUED | OUTPATIENT
Start: 2025-05-07 | End: 2025-05-07

## 2025-05-07 RX ORDER — SODIUM CHLORIDE, SODIUM LACTATE, POTASSIUM CHLORIDE, CALCIUM CHLORIDE 600; 310; 30; 20 MG/100ML; MG/100ML; MG/100ML; MG/100ML
INJECTION, SOLUTION INTRAVENOUS CONTINUOUS PRN
Status: DISCONTINUED | OUTPATIENT
Start: 2025-05-07 | End: 2025-05-07

## 2025-05-07 RX ORDER — LIDOCAINE HYDROCHLORIDE 20 MG/ML
INJECTION, SOLUTION EPIDURAL; INFILTRATION; INTRACAUDAL; PERINEURAL AS NEEDED
Status: DISCONTINUED | OUTPATIENT
Start: 2025-05-07 | End: 2025-05-07

## 2025-05-07 RX ORDER — FENTANYL CITRATE 50 UG/ML
50 INJECTION, SOLUTION INTRAMUSCULAR; INTRAVENOUS EVERY 5 MIN PRN
Status: DISCONTINUED | OUTPATIENT
Start: 2025-05-07 | End: 2025-05-07 | Stop reason: HOSPADM

## 2025-05-07 RX ORDER — FENTANYL CITRATE 50 UG/ML
INJECTION, SOLUTION INTRAMUSCULAR; INTRAVENOUS AS NEEDED
Status: DISCONTINUED | OUTPATIENT
Start: 2025-05-07 | End: 2025-05-07

## 2025-05-07 RX ORDER — ONDANSETRON HYDROCHLORIDE 2 MG/ML
INJECTION, SOLUTION INTRAVENOUS AS NEEDED
Status: DISCONTINUED | OUTPATIENT
Start: 2025-05-07 | End: 2025-05-07

## 2025-05-07 RX ORDER — KETAMINE HYDROCHLORIDE 50 MG/ML
INJECTION, SOLUTION INTRAMUSCULAR; INTRAVENOUS AS NEEDED
Status: DISCONTINUED | OUTPATIENT
Start: 2025-05-07 | End: 2025-05-07

## 2025-05-07 RX ORDER — SODIUM CHLORIDE, SODIUM LACTATE, POTASSIUM CHLORIDE, CALCIUM CHLORIDE 600; 310; 30; 20 MG/100ML; MG/100ML; MG/100ML; MG/100ML
100 INJECTION, SOLUTION INTRAVENOUS CONTINUOUS
Status: DISCONTINUED | OUTPATIENT
Start: 2025-05-07 | End: 2025-05-07 | Stop reason: HOSPADM

## 2025-05-07 RX ORDER — HYDROCODONE BITARTRATE AND ACETAMINOPHEN 10; 325 MG/1; MG/1
1 TABLET ORAL EVERY 6 HOURS PRN
Qty: 10 TABLET | Refills: 0 | Status: CANCELLED | OUTPATIENT
Start: 2025-05-07 | End: 2025-05-14

## 2025-05-07 RX ORDER — MEPERIDINE HYDROCHLORIDE 25 MG/ML
12.5 INJECTION INTRAMUSCULAR; INTRAVENOUS; SUBCUTANEOUS EVERY 10 MIN PRN
Status: DISCONTINUED | OUTPATIENT
Start: 2025-05-07 | End: 2025-05-07 | Stop reason: HOSPADM

## 2025-05-07 RX ORDER — MIDAZOLAM HYDROCHLORIDE 1 MG/ML
INJECTION, SOLUTION INTRAMUSCULAR; INTRAVENOUS AS NEEDED
Status: DISCONTINUED | OUTPATIENT
Start: 2025-05-07 | End: 2025-05-07

## 2025-05-07 RX ORDER — HYDRALAZINE HYDROCHLORIDE 20 MG/ML
5 INJECTION INTRAMUSCULAR; INTRAVENOUS EVERY 10 MIN PRN
Status: DISCONTINUED | OUTPATIENT
Start: 2025-05-07 | End: 2025-05-07 | Stop reason: HOSPADM

## 2025-05-07 RX ORDER — HYDROCODONE BITARTRATE AND ACETAMINOPHEN 10; 325 MG/1; MG/1
1 TABLET ORAL EVERY 6 HOURS PRN
Refills: 0 | Status: COMPLETED | OUTPATIENT
Start: 2025-05-07 | End: 2025-05-07

## 2025-05-07 RX ORDER — ONDANSETRON HYDROCHLORIDE 2 MG/ML
4 INJECTION, SOLUTION INTRAVENOUS ONCE AS NEEDED
Status: DISCONTINUED | OUTPATIENT
Start: 2025-05-07 | End: 2025-05-07 | Stop reason: HOSPADM

## 2025-05-07 RX ORDER — OXYCODONE HYDROCHLORIDE 5 MG/1
5 TABLET ORAL ONCE
Refills: 0 | Status: DISCONTINUED | OUTPATIENT
Start: 2025-05-07 | End: 2025-05-07 | Stop reason: HOSPADM

## 2025-05-07 RX ORDER — LIDOCAINE HYDROCHLORIDE AND EPINEPHRINE 10; 10 UG/ML; MG/ML
INJECTION, SOLUTION INFILTRATION; PERINEURAL AS NEEDED
Status: DISCONTINUED | OUTPATIENT
Start: 2025-05-07 | End: 2025-05-07 | Stop reason: HOSPADM

## 2025-05-07 RX ORDER — ALBUTEROL SULFATE 0.83 MG/ML
2.5 SOLUTION RESPIRATORY (INHALATION) ONCE AS NEEDED
Status: DISCONTINUED | OUTPATIENT
Start: 2025-05-07 | End: 2025-05-07 | Stop reason: HOSPADM

## 2025-05-07 RX ORDER — LABETALOL HYDROCHLORIDE 5 MG/ML
5 INJECTION, SOLUTION INTRAVENOUS EVERY 10 MIN PRN
Status: DISCONTINUED | OUTPATIENT
Start: 2025-05-07 | End: 2025-05-07 | Stop reason: HOSPADM

## 2025-05-07 RX ORDER — ROCURONIUM BROMIDE 10 MG/ML
INJECTION, SOLUTION INTRAVENOUS AS NEEDED
Status: DISCONTINUED | OUTPATIENT
Start: 2025-05-07 | End: 2025-05-07

## 2025-05-07 RX ORDER — LIDOCAINE HYDROCHLORIDE 10 MG/ML
0.1 INJECTION, SOLUTION INFILTRATION; PERINEURAL ONCE
Status: DISCONTINUED | OUTPATIENT
Start: 2025-05-07 | End: 2025-05-07 | Stop reason: HOSPADM

## 2025-05-07 RX ORDER — CEFAZOLIN SODIUM 2 G/50ML
2 SOLUTION INTRAVENOUS ONCE
Status: COMPLETED | OUTPATIENT
Start: 2025-05-07 | End: 2025-05-07

## 2025-05-07 RX ORDER — MIDAZOLAM HYDROCHLORIDE 1 MG/ML
1 INJECTION, SOLUTION INTRAMUSCULAR; INTRAVENOUS ONCE AS NEEDED
Status: DISCONTINUED | OUTPATIENT
Start: 2025-05-07 | End: 2025-05-07 | Stop reason: HOSPADM

## 2025-05-07 RX ADMIN — SODIUM CHLORIDE, POTASSIUM CHLORIDE, SODIUM LACTATE AND CALCIUM CHLORIDE: 600; 310; 30; 20 INJECTION, SOLUTION INTRAVENOUS at 13:00

## 2025-05-07 RX ADMIN — DEXAMETHASONE SODIUM PHOSPHATE 4 MG: 4 INJECTION, SOLUTION INTRAMUSCULAR; INTRAVENOUS at 13:13

## 2025-05-07 RX ADMIN — FENTANYL CITRATE 50 MCG: 0.05 INJECTION, SOLUTION INTRAMUSCULAR; INTRAVENOUS at 14:30

## 2025-05-07 RX ADMIN — CEFAZOLIN SODIUM 2 G: 2 SOLUTION INTRAVENOUS at 13:13

## 2025-05-07 RX ADMIN — ROCURONIUM BROMIDE 50 MG: 10 INJECTION, SOLUTION INTRAVENOUS at 13:11

## 2025-05-07 RX ADMIN — FENTANYL CITRATE 50 MCG: 50 INJECTION, SOLUTION INTRAMUSCULAR; INTRAVENOUS at 13:28

## 2025-05-07 RX ADMIN — LIDOCAINE HYDROCHLORIDE 50 MG: 20 INJECTION, SOLUTION EPIDURAL; INFILTRATION; INTRACAUDAL at 13:11

## 2025-05-07 RX ADMIN — PROPOFOL 200 MG: 10 INJECTION, EMULSION INTRAVENOUS at 13:11

## 2025-05-07 RX ADMIN — LABETALOL HYDROCHLORIDE 5 MG: 5 INJECTION INTRAVENOUS at 15:21

## 2025-05-07 RX ADMIN — HYDRALAZINE HYDROCHLORIDE 5 MG: 20 INJECTION INTRAMUSCULAR; INTRAVENOUS at 14:43

## 2025-05-07 RX ADMIN — PROPOFOL 75 MCG/KG/MIN: 10 INJECTION, EMULSION INTRAVENOUS at 13:13

## 2025-05-07 RX ADMIN — SUGAMMADEX 200 MG: 100 INJECTION, SOLUTION INTRAVENOUS at 13:58

## 2025-05-07 RX ADMIN — FENTANYL CITRATE 50 MCG: 0.05 INJECTION, SOLUTION INTRAMUSCULAR; INTRAVENOUS at 14:45

## 2025-05-07 RX ADMIN — HYDRALAZINE HYDROCHLORIDE 5 MG: 20 INJECTION INTRAMUSCULAR; INTRAVENOUS at 14:30

## 2025-05-07 RX ADMIN — FENTANYL CITRATE 50 MCG: 50 INJECTION, SOLUTION INTRAMUSCULAR; INTRAVENOUS at 13:11

## 2025-05-07 RX ADMIN — FENTANYL CITRATE 50 MCG: 0.05 INJECTION, SOLUTION INTRAMUSCULAR; INTRAVENOUS at 14:19

## 2025-05-07 RX ADMIN — HYDRALAZINE HYDROCHLORIDE 5 MG: 20 INJECTION INTRAMUSCULAR; INTRAVENOUS at 14:55

## 2025-05-07 RX ADMIN — KETAMINE HYDROCHLORIDE 50 MG: 50 INJECTION, SOLUTION INTRAMUSCULAR; INTRAVENOUS at 13:12

## 2025-05-07 RX ADMIN — HYDRALAZINE HYDROCHLORIDE 5 MG: 20 INJECTION INTRAMUSCULAR; INTRAVENOUS at 15:05

## 2025-05-07 RX ADMIN — MIDAZOLAM 2 MG: 1 INJECTION INTRAMUSCULAR; INTRAVENOUS at 13:00

## 2025-05-07 RX ADMIN — HYDROCODONE BITARTRATE AND ACETAMINOPHEN 1 TABLET: 10; 325 TABLET ORAL at 15:23

## 2025-05-07 RX ADMIN — ONDANSETRON 4 MG: 2 INJECTION, SOLUTION INTRAMUSCULAR; INTRAVENOUS at 13:13

## 2025-05-07 SDOH — HEALTH STABILITY: MENTAL HEALTH: CURRENT SMOKER: 0

## 2025-05-07 ASSESSMENT — PAIN - FUNCTIONAL ASSESSMENT
PAIN_FUNCTIONAL_ASSESSMENT: 0-10

## 2025-05-07 ASSESSMENT — PAIN DESCRIPTION - DESCRIPTORS
DESCRIPTORS: ACHING
DESCRIPTORS: DULL

## 2025-05-07 ASSESSMENT — PAIN SCALES - GENERAL
PAINLEVEL_OUTOF10: 5 - MODERATE PAIN
PAINLEVEL_OUTOF10: 3
PAINLEVEL_OUTOF10: 4
PAIN_LEVEL: 2
PAINLEVEL_OUTOF10: 0 - NO PAIN
PAINLEVEL_OUTOF10: 3
PAINLEVEL_OUTOF10: 2
PAINLEVEL_OUTOF10: 2

## 2025-05-07 ASSESSMENT — COLUMBIA-SUICIDE SEVERITY RATING SCALE - C-SSRS
2. HAVE YOU ACTUALLY HAD ANY THOUGHTS OF KILLING YOURSELF?: NO
6. HAVE YOU EVER DONE ANYTHING, STARTED TO DO ANYTHING, OR PREPARED TO DO ANYTHING TO END YOUR LIFE?: NO
1. IN THE PAST MONTH, HAVE YOU WISHED YOU WERE DEAD OR WISHED YOU COULD GO TO SLEEP AND NOT WAKE UP?: NO

## 2025-05-07 NOTE — ANESTHESIA PROCEDURE NOTES
Airway  Date/Time: 5/7/2025 1:12 PM  Reason: elective    Airway not difficult    Staffing  Performed: RAMÓN   Authorized by: Kamar Smith MD    Performed by: RAMÓN Lerner  Patient location during procedure: OR    Patient Condition  Indications for airway management: anesthesia and airway protection  Patient position: sniffing  Planned trial extubation  Sedation level: deep     Final Airway Details   Preoxygenated: yes  Final airway type: endotracheal airway  Successful airway: ETT  Cuffed: yes   Successful intubation technique: direct laryngoscopy  Adjuncts used in placement: intubating stylet  Blade: Zachary  Blade size: #3  ETT size (mm): 7.5  Cormack-Lehane Classification: grade I - full view of glottis  Placement verified by: chest auscultation, capnometry and palpation of cuff   Measured from: lips  ETT to lips (cm): 22  Number of attempts at approach: 1

## 2025-05-07 NOTE — ANESTHESIA PREPROCEDURE EVALUATION
Patient: Taye Gaming    Procedure Information       Date/Time: 05/07/25 1215    Procedure: EXCISION, NEOPLASM, SOFT TISSUE, SUBCUTANEOUS (Bilateral)    Location: [facility] / [facility]    Surgeons: Uzma Browning MD            Relevant Problems   Cardiac   (+) Hypertension      Neuro   (+) Generalized anxiety disorder   (+) Neuralgic amyotrophy      GI   (+) Gastroesophageal reflux disease      /Renal   (+) Benign prostatic hyperplasia without lower urinary tract symptoms   (+) Calculus of kidney   (+) Hydronephrosis   (+) Nephrolithiasis      Endocrine   (+) Obesity      Musculoskeletal   (+) DJD of left AC (acromioclavicular) joint      HEENT   (+) Glaucoma   (+) Primary open-angle glaucoma, left eye, severe stage   (+) Primary open-angle glaucoma, right eye, moderate stage      ID   (+) Tinea unguium      Skin   (+) Eczema       Clinical information reviewed:   Tobacco  Allergies  Meds   Med Hx  Surg Hx   Fam Hx  Soc Hx        NPO Detail:  NPO/Void Status  Carbohydrate Drink Given Prior to Surgery? : N  Date of Last Liquid: 05/06/25  Time of Last Liquid: 2300  Date of Last Solid: 05/06/25  Time of Last Solid: 2300  Last Intake Type: Clear fluids  Time of Last Void: 1036         Physical Exam    Airway  Mallampati: II  TM distance: >3 FB  Neck ROM: full  Mouth opening: 3 or more finger widths     Cardiovascular - normal exam   Dental - normal exam     Pulmonary - normal exam   Abdominal - normal exam           Anesthesia Plan    History of general anesthesia?: yes  History of complications of general anesthesia?: no    ASA 2     MAC     The patient is not a current smoker.  Patient was not previously instructed to abstain from smoking on day of procedure.  Patient did not smoke on day of procedure.  Education provided regarding risk of obstructive sleep apnea.  intravenous induction   Postoperative pain plan includes opioids.  Trial extubation is planned.  Anesthetic plan and risks discussed with  This note will not be viewable in [address] RNCM spoke w/ pt regarding COPD. Pt reports compliance w/ resp medications. Pt states f/u w/ PCP was cancelled and directed to [facility]. Pt remains on 4tlrs O2. RNCM discussed upcoming appt w/ PCP 11/2/18 4p. Discussed smoking cessation, pt reports she is still at 3 cigartettes/day. RNCM asked pt if she has any further goals for her COPD/health. Pt stated \"at 78 you don't have any goals, you just go with it\". RNCM discussed resp s/s to report to her providers to prevent rehospitalization. Pt verbalizes understanding. Next Steps: RNCM scheduled f/u in 1wk for continued COPD educ. patient.  Use of blood products discussed with patient who consented to blood products.    Plan discussed with CAA, attending and CRNA.

## 2025-05-07 NOTE — OP NOTE
EXCISION, NEOPLASM, SOFT TISSUE, SUBCUTANEOUS (B) Operative Note     Date: 2025  OR Location: [facility]    Name: Taye Gaming, : 1960, Age: 64 y.o., MRN: [id number], Sex: male    Diagnosis  Pre-op Diagnosis      * Soft tissue mass [M79.89] Post-op Diagnosis     * Soft tissue mass [M79.89]     Procedures  EXCISION, NEOPLASM, SOFT TISSUE, SUBCUTANEOUS  [address] - [place] EXCISION TUMOR SOFT TIS BACK/FLANK SUBQ 3 CM/>      Surgeons      * Uzma Browning - Primary    Resident/Fellow/Other Assistant:  Surgeons and Role:  * No surgeons found with a matching role *    Staff:   Surgical Assistant:   Circulator: Patricia Hdzub Person: Shelli Hdzub Person: Paulette    Anesthesia Staff: Anesthesiologist: Kamar Smith MD  C-AA: RAMÓN Lerner    Procedure Summary  Anesthesia: Monitor Anesthesia Care  ASA: II  Estimated Blood Loss: 5mL  Intra-op Medications:   Administrations occurring from 1215 to 1315 on 25:   Medication Name Total Dose   ceFAZolin (Ancef) 2 g in dextrose (iso) IV 50 mL 2 g   dexAMETHasone (Decadron) 4 mg/mL IV Syringe 2 mL 4 mg   fentaNYL (Sublimaze) injection 50 mcg/mL 50 mcg   ketamine 50 mg/mL IV vial 50 mg   LR infusion Cannot be calculated   lidocaine PF (Xylocaine-MPF) local injection 2 % 50 mg   midazolam (Versed) injection 1 mg/mL 2 mg   ondansetron (Zofran) 2 mg/mL injection 4 mg   propofol (Diprivan) injection 10 mg/mL 216.58 mg   rocuronium (ZeMuron) 50 mg/5 mL injection 50 mg              Anesthesia Record               Intraprocedure I/O Totals          Intake    Ketamine 1.00 mL    The total shown is the total volume documented since Anesthesia Start was filed.    LR infusion 600.00 mL    ceFAZolin (Ancef) 2 g in dextrose (iso) IV 50 mL 50.00 mL    Total Intake 651 mL       Output    Est. Blood Loss 5 mL    Total Output 5 mL       Net    Net Volume 646 mL          Specimen:   ID Type Source Tests Collected by Time   1 : left upper back soft tissue mass Tissue SOFT TISSUE MASS  RESECTION SURGICAL PATHOLOGY EXAM Uzma Browning MD 5/7/2025 8615   2 : right upper back soft tissue mass Tissue SOFT TISSUE MASS RESECTION SURGICAL PATHOLOGY EXAM Uzma Browning MD 5/7/2025 1332              Findings: Subcutaneous soft tissue mass of the right and left upper back.  Left measured ~8 cm, right measured ~5    Indications: Taye Gaming is an 64 y.o. male who is having surgery for Soft tissue mass [M79.89].     The patient was seen in the preoperative area. The risks, benefits, complications, treatment options, non-operative alternatives, expected recovery and outcomes were discussed with the patient. The possibilities of reaction to medication, pulmonary aspiration, injury to surrounding structures, bleeding, recurrent infection, the need for additional procedures, failure to diagnose a condition, and creating a complication requiring transfusion or operation were discussed with the patient. The patient concurred with the proposed plan, giving informed consent.  The site of surgery was properly noted/marked if necessary per policy. The patient has been actively warmed in preoperative area. Preoperative antibiotics have been ordered and given within 1 hours of incision. Venous thrombosis prophylaxis have been ordered including bilateral sequential compression devices    Procedure Details:   Informed consent obtained from the patient.  He was brought to the operating room, SCDs placed, antibiotics given and general anesthesia was induced on the stretcher.  He was transitioned into the prone position and all bony prominences were padded.  His upper back was prepped and draped.  A timeout was performed.  I started on the left side.  I made an incision overlying Langerhans lines and took this down to the subcutaneous tissue.  A soft tissue mass consistent with lipoma was identified.  It was lobulated extending both medially and inferiorly.  Using combination of blunt dissection and Bovie electrocautery, the  mass was able to be excised.  Hemostasis was ensured.  The deeper layers were closed with 3-0 Vicryl in 2 separate layers and the skin was closed with 4-0 Monocryl and Dermabond.  I then moved onto the right side where similarly an incision was made over Langerhans lines.  A soft tissue mass was identified in the subcutaneous space, smaller than the left side.  It was dissected out circumferentially.  This was also lobulated extending more inferiorly on top of the shoulder.  It was excised and hemostasis ensured.  The deeper layers was closed with 3-0 Vicryl and then the skin with 4-0 Monocryl and Dermabond.  He tolerated the procedure well.  He was transition back into the stretcher and general esthesia was reversed and he was taken to PACU in stable condition.  Evidence of Infection: No   Complications:  None; patient tolerated the procedure well.    Disposition: PACU - hemodynamically stable.  Condition: stable           Uzma Browning  Phone Number: [phone]

## 2025-05-07 NOTE — DISCHARGE INSTRUCTIONS
Keep incisions clean and dry.  There are dissolvable stitches and waterproof glue over top.  You can shower and let soap and water run over the incisions.  The glue will fall off in 1 to 2 weeks.    No strenuous exercise for a week to allow the incisions to heal.     Call the clinic with questions or concerns.

## 2025-05-07 NOTE — ANESTHESIA POSTPROCEDURE EVALUATION
Patient: Taye Gaming    Procedure Summary       Date: 05/07/25 Room / Location: [facility]    Anesthesia Start: 1300 Anesthesia Stop: 1414    Procedure: EXCISION, NEOPLASM, SOFT TISSUE, SUBCUTANEOUS (Bilateral) Diagnosis:       Soft tissue mass      (Soft tissue mass [M79.89])    Surgeons: Uzma Browning MD Responsible Provider: Kamar Smith MD    Anesthesia Type: general ASA Status: 2            Anesthesia Type: general    Vitals Value Taken Time   /86 05/07/25 15:50   Temp 36 °C (96.8 °F) 05/07/25 14:07   Pulse 79 05/07/25 15:50   Resp 15 05/07/25 15:50   SpO2 95 % 05/07/25 15:50       Anesthesia Post Evaluation    Patient location during evaluation: PACU  Patient participation: complete - patient participated  Level of consciousness: sleepy but conscious  Pain score: 2  Pain management: adequate  Multimodal analgesia pain management approach  Airway patency: patent  Cardiovascular status: acceptable  Respiratory status: acceptable  Hydration status: acceptable  Postoperative Nausea and Vomiting: none        There were no known notable events for this encounter.

## 2025-05-08 NOTE — TELEPHONE ENCOUNTER
Patient called back and I was able to talk with him.  He was requesting narcotic pain medication.  I explained that for a lipoma excision we do not prescribe narcotic pain medication.  I discussed adding a muscle relaxer and using multimodal pain management with heat and ice pads.  At this point he became very aggressive and angry and was yelling at the phone.  He said he would only accept 10mg of Norco.  I again explained typical pain regimen, however he continued to yell and say that his blood pressure was high and he was going to have to come to the ER if it did not lower.  He did get 1 Norco in the recovery area, will prescribe him a few tablets for the coming days.  He should continue to use adjunct pain control with Motrin, heat pad and ice pack.  Instructed him not to take the Tylenol if he is also taking the Norco.

## 2025-05-08 NOTE — PROGRESS NOTES
Pt called back. I returned call several times and again went to voicemail and I left message on last call

## 2025-05-08 NOTE — PROGRESS NOTES
Notified by answering service that patient had called. Called patient back three times, went to voicemail each time. Left voicemail at last attempt.

## 2025-05-14 LAB
LABORATORY COMMENT REPORT: NORMAL
PATH REPORT.FINAL DX SPEC: NORMAL
PATH REPORT.GROSS SPEC: NORMAL
PATH REPORT.RELEVANT HX SPEC: NORMAL
PATH REPORT.TOTAL CANCER: NORMAL

## 2025-05-20 ENCOUNTER — OFFICE VISIT (OUTPATIENT)
Dept: SURGERY | Facility: CLINIC | Age: 65
End: 2025-05-20
Payer: COMMERCIAL

## 2025-05-20 VITALS
HEIGHT: 69 IN | OXYGEN SATURATION: 99 % | HEART RATE: 53 BPM | BODY MASS INDEX: 35.4 KG/M2 | TEMPERATURE: 98.1 F | WEIGHT: 239 LBS | DIASTOLIC BLOOD PRESSURE: 80 MMHG | SYSTOLIC BLOOD PRESSURE: 130 MMHG

## 2025-05-20 DIAGNOSIS — Z09 POSTOPERATIVE EXAMINATION: Primary | ICD-10-CM

## 2025-05-20 PROCEDURE — 3075F SYST BP GE 130 - 139MM HG: CPT | Performed by: STUDENT IN AN ORGANIZED HEALTH CARE EDUCATION/TRAINING PROGRAM

## 2025-05-20 PROCEDURE — 3008F BODY MASS INDEX DOCD: CPT | Performed by: STUDENT IN AN ORGANIZED HEALTH CARE EDUCATION/TRAINING PROGRAM

## 2025-05-20 PROCEDURE — 99211 OFF/OP EST MAY X REQ PHY/QHP: CPT | Performed by: STUDENT IN AN ORGANIZED HEALTH CARE EDUCATION/TRAINING PROGRAM

## 2025-05-20 PROCEDURE — 3079F DIAST BP 80-89 MM HG: CPT | Performed by: STUDENT IN AN ORGANIZED HEALTH CARE EDUCATION/TRAINING PROGRAM

## 2025-05-20 PROCEDURE — 1036F TOBACCO NON-USER: CPT | Performed by: STUDENT IN AN ORGANIZED HEALTH CARE EDUCATION/TRAINING PROGRAM

## 2025-05-20 ASSESSMENT — PAIN SCALES - GENERAL: PAINLEVEL_OUTOF10: 0-NO PAIN

## 2025-05-20 ASSESSMENT — PATIENT HEALTH QUESTIONNAIRE - PHQ9
2. FEELING DOWN, DEPRESSED OR HOPELESS: NOT AT ALL
SUM OF ALL RESPONSES TO PHQ9 QUESTIONS 1 AND 2: 0
1. LITTLE INTEREST OR PLEASURE IN DOING THINGS: NOT AT ALL

## 2025-05-20 ASSESSMENT — ENCOUNTER SYMPTOMS: WOUND: 1

## 2025-05-20 NOTE — PROGRESS NOTES
Subjective   Patient ID: Taye Gaming is a 64 y.o. male who presents for Post-op (S/p excision of soft tissue mass of right and left upper back on 5/7/25.  Pt states he is feeling well.  ).  Doing well since surgery.  Not having any pain.  No wound concerns.  Occasional itching.        Review of Systems   Skin:  Positive for wound.       Objective   Physical Exam  Skin:     Comments: Right and left upper back incisions well-healing, small amount of Dermabond remaining.  firness under the left sided incision         FINAL DIAGNOSIS      A. Left upper back mass, excision:  -- Fragments of mature adipose tissue consistent with lipoma.     B. 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     Assessment/Plan   Problem List Items Addressed This Visit    None  Visit Diagnoses         Codes      Postoperative examination    -  Primary Z09        Recovering well after surgery.  We reviewed his pathology which was benign.  Expect the firmness under the left incision to improve as time goes on since this was the larger of the 2 lipomas.  All questions answered.  Follow-up with me as needed.         Uzma Browning MD 05/20/25

## (undated) DEVICE — GLOVE, SURGICAL, PROTEXIS PI , 7.0, PF, LF

## (undated) DEVICE — NEEDLE, HYPODERMIC, PROEDGE, 22G X 1.5, BLACK

## (undated) DEVICE — SUTURE, MONOCRYL, 4-0, 27 IN, PS-2, UNDYED

## (undated) DEVICE — SUTURE, VICRYL, 2-0, 27 IN, SH, UNDYED

## (undated) DEVICE — SLEEVE, VASO PRESS, CALF GARMENT, MEDIUM, GREEN

## (undated) DEVICE — SUTURE, VICRYL PLUS 3-0, SH, 27IN

## (undated) DEVICE — TIP, SUCTION, YANKAUER, BULB, ADULT

## (undated) DEVICE — GOWN, SURGICAL, SIRUS, NON REINFORCED, LARGE

## (undated) DEVICE — SUTURE, VICRYL, 3-0, 27 IN, SH

## (undated) DEVICE — GLOVE, SURGICAL, PROTEXIS PI BLUE W/NEUTHERA, 7.5, PF, LF

## (undated) DEVICE — Device

## (undated) DEVICE — SYRINGE, 10 CC, LUER LOCK

## (undated) DEVICE — SOLUTION, IRRIGATION, X RX SODIUM CHL 0.9%, 1000ML BTL

## (undated) DEVICE — CAUTERY, PENCIL, PUSH BUTTON, SMOKE EVAC, 70MM